# Patient Record
Sex: MALE | Race: WHITE | NOT HISPANIC OR LATINO | URBAN - METROPOLITAN AREA
[De-identification: names, ages, dates, MRNs, and addresses within clinical notes are randomized per-mention and may not be internally consistent; named-entity substitution may affect disease eponyms.]

---

## 2022-09-22 ENCOUNTER — INPATIENT (INPATIENT)
Facility: HOSPITAL | Age: 77
LOS: 0 days | Discharge: HOME | DRG: 812 | End: 2022-09-23
Attending: STUDENT IN AN ORGANIZED HEALTH CARE EDUCATION/TRAINING PROGRAM | Admitting: GENERAL ACUTE CARE HOSPITAL
Payer: COMMERCIAL

## 2022-09-22 VITALS
HEART RATE: 84 BPM | TEMPERATURE: 98 F | OXYGEN SATURATION: 100 % | SYSTOLIC BLOOD PRESSURE: 142 MMHG | DIASTOLIC BLOOD PRESSURE: 70 MMHG | RESPIRATION RATE: 16 BRPM | WEIGHT: 190.04 LBS | HEIGHT: 72 IN

## 2022-09-22 DIAGNOSIS — D50.9 IRON DEFICIENCY ANEMIA, UNSPECIFIED: ICD-10-CM

## 2022-09-22 DIAGNOSIS — S62.609A FRACTURE OF UNSPECIFIED PHALANX OF UNSPECIFIED FINGER, INITIAL ENCOUNTER FOR CLOSED FRACTURE: ICD-10-CM

## 2022-09-22 DIAGNOSIS — Z29.9 ENCOUNTER FOR PROPHYLACTIC MEASURES, UNSPECIFIED: ICD-10-CM
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
## 2022-09-22 DIAGNOSIS — K40.90 UNILATERAL INGUINAL HERNIA, WITHOUT OBSTRUCTION OR GANGRENE, NOT SPECIFIED AS RECURRENT: ICD-10-CM

## 2022-09-22 DIAGNOSIS — Z98.890 OTHER SPECIFIED POSTPROCEDURAL STATES: Chronic | ICD-10-CM

## 2022-09-22 DIAGNOSIS — N43.3 HYDROCELE, UNSPECIFIED: ICD-10-CM

## 2022-09-22 LAB
ALBUMIN SERPL ELPH-MCNC: 3 G/DL — LOW (ref 3.4–5)
ALBUMIN SERPL ELPH-MCNC: 3.6 G/DL — SIGNIFICANT CHANGE UP (ref 3.3–5)
ALP SERPL-CCNC: 133 U/L — HIGH (ref 40–120)
ALP SERPL-CCNC: 139 U/L — HIGH (ref 40–120)
ALT FLD-CCNC: 13 U/L — SIGNIFICANT CHANGE UP (ref 12–42)
ALT FLD-CCNC: 7 U/L — LOW (ref 10–45)
AMPHET UR-MCNC: NEGATIVE — SIGNIFICANT CHANGE UP
ANION GAP SERPL CALC-SCNC: 6 MMOL/L — LOW (ref 9–16)
ANION GAP SERPL CALC-SCNC: 8 MMOL/L — SIGNIFICANT CHANGE UP (ref 5–17)
ANISOCYTOSIS BLD QL: SLIGHT — SIGNIFICANT CHANGE UP
APPEARANCE UR: CLEAR — SIGNIFICANT CHANGE UP
APTT BLD: 32 SEC — SIGNIFICANT CHANGE UP (ref 27.5–35.5)
AST SERPL-CCNC: 14 U/L — SIGNIFICANT CHANGE UP (ref 10–40)
AST SERPL-CCNC: 24 U/L — SIGNIFICANT CHANGE UP (ref 15–37)
BARBITURATES UR SCN-MCNC: NEGATIVE — SIGNIFICANT CHANGE UP
BASOPHILS # BLD AUTO: 0.06 K/UL — SIGNIFICANT CHANGE UP (ref 0–0.2)
BASOPHILS # BLD AUTO: 0.1 K/UL — SIGNIFICANT CHANGE UP (ref 0–0.2)
BASOPHILS NFR BLD AUTO: 1.3 % — SIGNIFICANT CHANGE UP (ref 0–2)
BASOPHILS NFR BLD AUTO: 2.6 % — HIGH (ref 0–2)
BENZODIAZ UR-MCNC: NEGATIVE — SIGNIFICANT CHANGE UP
BILIRUB SERPL-MCNC: 0.3 MG/DL — SIGNIFICANT CHANGE UP (ref 0.2–1.2)
BILIRUB SERPL-MCNC: 0.5 MG/DL — SIGNIFICANT CHANGE UP (ref 0.2–1.2)
BILIRUB UR-MCNC: NEGATIVE — SIGNIFICANT CHANGE UP
BLD GP AB SCN SERPL QL: NEGATIVE — SIGNIFICANT CHANGE UP
BUN SERPL-MCNC: 14 MG/DL — SIGNIFICANT CHANGE UP (ref 7–23)
BUN SERPL-MCNC: 17 MG/DL — SIGNIFICANT CHANGE UP (ref 7–23)
CALCIUM SERPL-MCNC: 8.7 MG/DL — SIGNIFICANT CHANGE UP (ref 8.5–10.5)
CALCIUM SERPL-MCNC: 8.8 MG/DL — SIGNIFICANT CHANGE UP (ref 8.4–10.5)
CHLORIDE SERPL-SCNC: 105 MMOL/L — SIGNIFICANT CHANGE UP (ref 96–108)
CHLORIDE SERPL-SCNC: 105 MMOL/L — SIGNIFICANT CHANGE UP (ref 96–108)
CO2 SERPL-SCNC: 25 MMOL/L — SIGNIFICANT CHANGE UP (ref 22–31)
CO2 SERPL-SCNC: 26 MMOL/L — SIGNIFICANT CHANGE UP (ref 22–31)
COCAINE METAB.OTHER UR-MCNC: NEGATIVE — SIGNIFICANT CHANGE UP
COLOR SPEC: YELLOW — SIGNIFICANT CHANGE UP
CREAT SERPL-MCNC: 0.87 MG/DL — SIGNIFICANT CHANGE UP (ref 0.5–1.3)
CREAT SERPL-MCNC: 1.03 MG/DL — SIGNIFICANT CHANGE UP (ref 0.5–1.3)
DACRYOCYTES BLD QL SMEAR: SLIGHT — SIGNIFICANT CHANGE UP
DACRYOCYTES BLD QL SMEAR: SLIGHT — SIGNIFICANT CHANGE UP
DIFF PNL FLD: NEGATIVE — SIGNIFICANT CHANGE UP
EGFR: 75 ML/MIN/1.73M2 — SIGNIFICANT CHANGE UP
EGFR: 89 ML/MIN/1.73M2 — SIGNIFICANT CHANGE UP
EOSINOPHIL # BLD AUTO: 0.17 K/UL — SIGNIFICANT CHANGE UP (ref 0–0.5)
EOSINOPHIL # BLD AUTO: 0.2 K/UL — SIGNIFICANT CHANGE UP (ref 0–0.5)
EOSINOPHIL NFR BLD AUTO: 4.4 % — SIGNIFICANT CHANGE UP (ref 0–6)
EOSINOPHIL NFR BLD AUTO: 4.5 % — SIGNIFICANT CHANGE UP (ref 0–6)
ETHANOL SERPL-MCNC: <3 MG/DL — SIGNIFICANT CHANGE UP
FERRITIN SERPL-MCNC: 27 NG/ML — LOW (ref 30–400)
GIANT PLATELETS BLD QL SMEAR: PRESENT — SIGNIFICANT CHANGE UP
GLUCOSE SERPL-MCNC: 83 MG/DL — SIGNIFICANT CHANGE UP (ref 70–99)
GLUCOSE SERPL-MCNC: 96 MG/DL — SIGNIFICANT CHANGE UP (ref 70–99)
GLUCOSE UR QL: NEGATIVE — SIGNIFICANT CHANGE UP
HCT VFR BLD CALC: 19.7 % — CRITICAL LOW (ref 39–50)
HCT VFR BLD CALC: 19.7 % — CRITICAL LOW (ref 39–50)
HCT VFR BLD CALC: 21.4 % — LOW (ref 39–50)
HCT VFR BLD CALC: 26.4 % — LOW (ref 39–50)
HGB BLD-MCNC: 5.7 G/DL — CRITICAL LOW (ref 13–17)
HGB BLD-MCNC: 5.8 G/DL — CRITICAL LOW (ref 13–17)
HGB BLD-MCNC: 6.3 G/DL — CRITICAL LOW (ref 13–17)
HGB BLD-MCNC: 7.8 G/DL — LOW (ref 13–17)
HYPOCHROMIA BLD QL: SIGNIFICANT CHANGE UP
HYPOCHROMIA BLD QL: SLIGHT — SIGNIFICANT CHANGE UP
IMM GRANULOCYTES NFR BLD AUTO: 0.4 % — SIGNIFICANT CHANGE UP (ref 0–0.9)
INR BLD: 1.07 — SIGNIFICANT CHANGE UP (ref 0.88–1.16)
IRON SATN MFR SERPL: 11 % — LOW (ref 16–55)
IRON SATN MFR SERPL: 41 UG/DL — LOW (ref 45–165)
KETONES UR-MCNC: NEGATIVE — SIGNIFICANT CHANGE UP
LACTATE SERPL-SCNC: 0.9 MMOL/L — SIGNIFICANT CHANGE UP (ref 0.4–2)
LEUKOCYTE ESTERASE UR-ACNC: NEGATIVE — SIGNIFICANT CHANGE UP
LIDOCAIN IGE QN: 53 U/L — LOW (ref 73–393)
LYMPHOCYTES # BLD AUTO: 0.21 K/UL — LOW (ref 1–3.3)
LYMPHOCYTES # BLD AUTO: 0.67 K/UL — LOW (ref 1–3.3)
LYMPHOCYTES # BLD AUTO: 14.9 % — SIGNIFICANT CHANGE UP (ref 13–44)
LYMPHOCYTES # BLD AUTO: 5.3 % — LOW (ref 13–44)
MAGNESIUM SERPL-MCNC: 2.1 MG/DL — SIGNIFICANT CHANGE UP (ref 1.6–2.6)
MANUAL SMEAR VERIFICATION: SIGNIFICANT CHANGE UP
MANUAL SMEAR VERIFICATION: SIGNIFICANT CHANGE UP
MCHC RBC-ENTMCNC: 17.8 PG — LOW (ref 27–34)
MCHC RBC-ENTMCNC: 18 PG — LOW (ref 27–34)
MCHC RBC-ENTMCNC: 18.6 PG — LOW (ref 27–34)
MCHC RBC-ENTMCNC: 19.4 PG — LOW (ref 27–34)
MCHC RBC-ENTMCNC: 28.9 GM/DL — LOW (ref 32–36)
MCHC RBC-ENTMCNC: 29.4 GM/DL — LOW (ref 32–36)
MCHC RBC-ENTMCNC: 29.4 GM/DL — LOW (ref 32–36)
MCHC RBC-ENTMCNC: 29.5 GM/DL — LOW (ref 32–36)
MCV RBC AUTO: 61.2 FL — LOW (ref 80–100)
MCV RBC AUTO: 61.6 FL — LOW (ref 80–100)
MCV RBC AUTO: 63.1 FL — LOW (ref 80–100)
MCV RBC AUTO: 65.7 FL — LOW (ref 80–100)
METHADONE UR-MCNC: NEGATIVE — SIGNIFICANT CHANGE UP
MICROCYTES BLD QL: SIGNIFICANT CHANGE UP
MICROCYTES BLD QL: SLIGHT — SIGNIFICANT CHANGE UP
MONOCYTES # BLD AUTO: 0.11 K/UL — SIGNIFICANT CHANGE UP (ref 0–0.9)
MONOCYTES # BLD AUTO: 0.58 K/UL — SIGNIFICANT CHANGE UP (ref 0–0.9)
MONOCYTES NFR BLD AUTO: 12.9 % — SIGNIFICANT CHANGE UP (ref 2–14)
MONOCYTES NFR BLD AUTO: 2.7 % — SIGNIFICANT CHANGE UP (ref 2–14)
NEUTROPHILS # BLD AUTO: 2.96 K/UL — SIGNIFICANT CHANGE UP (ref 1.8–7.4)
NEUTROPHILS # BLD AUTO: 3.37 K/UL — SIGNIFICANT CHANGE UP (ref 1.8–7.4)
NEUTROPHILS NFR BLD AUTO: 66 % — SIGNIFICANT CHANGE UP (ref 43–77)
NEUTROPHILS NFR BLD AUTO: 85 % — HIGH (ref 43–77)
NITRITE UR-MCNC: NEGATIVE — SIGNIFICANT CHANGE UP
NRBC # BLD: 0 /100 WBCS — SIGNIFICANT CHANGE UP (ref 0–0)
OB PNL STL: NEGATIVE — SIGNIFICANT CHANGE UP
OPIATES UR-MCNC: NEGATIVE — SIGNIFICANT CHANGE UP
OVALOCYTES BLD QL SMEAR: SLIGHT — SIGNIFICANT CHANGE UP
OVALOCYTES BLD QL SMEAR: SLIGHT — SIGNIFICANT CHANGE UP
PCP SPEC-MCNC: SIGNIFICANT CHANGE UP
PCP UR-MCNC: NEGATIVE — SIGNIFICANT CHANGE UP
PH UR: 6 — SIGNIFICANT CHANGE UP (ref 5–8)
PHOSPHATE SERPL-MCNC: 2.6 MG/DL — SIGNIFICANT CHANGE UP (ref 2.5–4.5)
PLAT MORPH BLD: ABNORMAL
PLAT MORPH BLD: NORMAL — SIGNIFICANT CHANGE UP
PLATELET # BLD AUTO: 345 K/UL — SIGNIFICANT CHANGE UP (ref 150–400)
PLATELET # BLD AUTO: 348 K/UL — SIGNIFICANT CHANGE UP (ref 150–400)
PLATELET # BLD AUTO: 367 K/UL — SIGNIFICANT CHANGE UP (ref 150–400)
PLATELET # BLD AUTO: 367 K/UL — SIGNIFICANT CHANGE UP (ref 150–400)
POIKILOCYTOSIS BLD QL AUTO: SLIGHT — SIGNIFICANT CHANGE UP
POLYCHROMASIA BLD QL SMEAR: SLIGHT — SIGNIFICANT CHANGE UP
POTASSIUM SERPL-MCNC: 4 MMOL/L — SIGNIFICANT CHANGE UP (ref 3.5–5.3)
POTASSIUM SERPL-MCNC: 4.1 MMOL/L — SIGNIFICANT CHANGE UP (ref 3.5–5.3)
POTASSIUM SERPL-SCNC: 4 MMOL/L — SIGNIFICANT CHANGE UP (ref 3.5–5.3)
POTASSIUM SERPL-SCNC: 4.1 MMOL/L — SIGNIFICANT CHANGE UP (ref 3.5–5.3)
PROT SERPL-MCNC: 7.1 G/DL — SIGNIFICANT CHANGE UP (ref 6–8.3)
PROT SERPL-MCNC: 7.7 G/DL — SIGNIFICANT CHANGE UP (ref 6.4–8.2)
PROT UR-MCNC: NEGATIVE MG/DL — SIGNIFICANT CHANGE UP
PROTHROM AB SERPL-ACNC: 12.5 SEC — SIGNIFICANT CHANGE UP (ref 10.5–13.4)
RBC # BLD: 3.2 M/UL — LOW (ref 4.2–5.8)
RBC # BLD: 3.22 M/UL — LOW (ref 4.2–5.8)
RBC # BLD: 3.39 M/UL — LOW (ref 4.2–5.8)
RBC # BLD: 4.02 M/UL — LOW (ref 4.2–5.8)
RBC # FLD: 18.3 % — HIGH (ref 10.3–14.5)
RBC # FLD: 18.4 % — HIGH (ref 10.3–14.5)
RBC # FLD: 20.2 % — HIGH (ref 10.3–14.5)
RBC # FLD: 22.1 % — HIGH (ref 10.3–14.5)
RBC BLD AUTO: ABNORMAL
RBC BLD AUTO: ABNORMAL
RH IG SCN BLD-IMP: POSITIVE — SIGNIFICANT CHANGE UP
SARS-COV-2 RNA SPEC QL NAA+PROBE: SIGNIFICANT CHANGE UP
SODIUM SERPL-SCNC: 137 MMOL/L — SIGNIFICANT CHANGE UP (ref 132–145)
SODIUM SERPL-SCNC: 138 MMOL/L — SIGNIFICANT CHANGE UP (ref 135–145)
SP GR SPEC: 1.01 — SIGNIFICANT CHANGE UP (ref 1–1.03)
TARGETS BLD QL SMEAR: SLIGHT — SIGNIFICANT CHANGE UP
THC UR QL: NEGATIVE — SIGNIFICANT CHANGE UP
TIBC SERPL-MCNC: 377 UG/DL — SIGNIFICANT CHANGE UP (ref 220–430)
TRANSFERRIN SERPL-MCNC: 320 MG/DL — SIGNIFICANT CHANGE UP (ref 200–360)
UIBC SERPL-MCNC: 336 UG/DL — SIGNIFICANT CHANGE UP (ref 110–370)
UROBILINOGEN FLD QL: 0.2 E.U./DL — SIGNIFICANT CHANGE UP
WBC # BLD: 3.75 K/UL — LOW (ref 3.8–10.5)
WBC # BLD: 3.97 K/UL — SIGNIFICANT CHANGE UP (ref 3.8–10.5)
WBC # BLD: 4.49 K/UL — SIGNIFICANT CHANGE UP (ref 3.8–10.5)
WBC # BLD: 5.36 K/UL — SIGNIFICANT CHANGE UP (ref 3.8–10.5)
WBC # FLD AUTO: 3.75 K/UL — LOW (ref 3.8–10.5)
WBC # FLD AUTO: 3.97 K/UL — SIGNIFICANT CHANGE UP (ref 3.8–10.5)
WBC # FLD AUTO: 4.49 K/UL — SIGNIFICANT CHANGE UP (ref 3.8–10.5)
WBC # FLD AUTO: 5.36 K/UL — SIGNIFICANT CHANGE UP (ref 3.8–10.5)

## 2022-09-22 PROCEDURE — 99285 EMERGENCY DEPT VISIT HI MDM: CPT | Mod: 25

## 2022-09-22 PROCEDURE — 74177 CT ABD & PELVIS W/CONTRAST: CPT | Mod: 26,MH

## 2022-09-22 PROCEDURE — 99221 1ST HOSP IP/OBS SF/LOW 40: CPT

## 2022-09-22 PROCEDURE — 70450 CT HEAD/BRAIN W/O DYE: CPT | Mod: 26,MH

## 2022-09-22 PROCEDURE — 73130 X-RAY EXAM OF HAND: CPT | Mod: 26,LT

## 2022-09-22 PROCEDURE — 72125 CT NECK SPINE W/O DYE: CPT | Mod: 26,MH

## 2022-09-22 PROCEDURE — 70486 CT MAXILLOFACIAL W/O DYE: CPT | Mod: 26,MH

## 2022-09-22 PROCEDURE — 73110 X-RAY EXAM OF WRIST: CPT | Mod: 26,LT

## 2022-09-22 RX ORDER — ACETAMINOPHEN 500 MG
650 TABLET ORAL EVERY 6 HOURS
Refills: 0 | Status: DISCONTINUED | OUTPATIENT
Start: 2022-09-22 | End: 2022-09-23

## 2022-09-22 RX ORDER — ACETAMINOPHEN 500 MG
650 TABLET ORAL ONCE
Refills: 0 | Status: COMPLETED | OUTPATIENT
Start: 2022-09-22 | End: 2022-09-22

## 2022-09-22 RX ORDER — INFLUENZA VIRUS VACCINE 15; 15; 15; 15 UG/.5ML; UG/.5ML; UG/.5ML; UG/.5ML
0.7 SUSPENSION INTRAMUSCULAR ONCE
Refills: 0 | Status: DISCONTINUED | OUTPATIENT
Start: 2022-09-22 | End: 2022-09-23

## 2022-09-22 RX ORDER — PANTOPRAZOLE SODIUM 20 MG/1
40 TABLET, DELAYED RELEASE ORAL
Refills: 0 | Status: DISCONTINUED | OUTPATIENT
Start: 2022-09-22 | End: 2022-09-23

## 2022-09-22 RX ORDER — TETANUS TOXOID, REDUCED DIPHTHERIA TOXOID AND ACELLULAR PERTUSSIS VACCINE, ADSORBED 5; 2.5; 8; 8; 2.5 [IU]/.5ML; [IU]/.5ML; UG/.5ML; UG/.5ML; UG/.5ML
0.5 SUSPENSION INTRAMUSCULAR ONCE
Refills: 0 | Status: COMPLETED | OUTPATIENT
Start: 2022-09-22 | End: 2022-09-22

## 2022-09-22 RX ADMIN — Medication 650 MILLIGRAM(S): at 01:49

## 2022-09-22 RX ADMIN — TETANUS TOXOID, REDUCED DIPHTHERIA TOXOID AND ACELLULAR PERTUSSIS VACCINE, ADSORBED 0.5 MILLILITER(S): 5; 2.5; 8; 8; 2.5 SUSPENSION INTRAMUSCULAR at 04:22

## 2022-09-22 RX ADMIN — Medication 650 MILLIGRAM(S): at 03:39

## 2022-09-22 NOTE — H&P ADULT - ASSESSMENT
77 y.o M with a PMHx of hernia repair s/p subtotal gastrectomy and right hemicolectomy in 2019 presents from [facility] w/ mechanical trip and fall. Pt currently w/ acute LUE 5th metacarpal fx, nasal bridge abrasion, microcytic anemia to 5.7, and massive scrotal hydrocele 3 years in duration.

## 2022-09-22 NOTE — ED PROVIDER NOTE - CARE PLAN
1 Principal Discharge DX:	Anemia  Secondary Diagnosis:	Inguinal hernia  Secondary Diagnosis:	Fracture, metacarpal  Secondary Diagnosis:	Forearm abrasion  Secondary Diagnosis:	Facial abrasion

## 2022-09-22 NOTE — H&P ADULT - HISTORY OF PRESENT ILLNESS
77 y.o M with a PMHx of hernia repair s/p subtotal gastrectomy and right hemicolectomy in 2019 presents from [facility] w/ mechanical trip and fall earlier today 9/22/2022. Pt says he was walking outside downtown carrying groceries when he "tripped on level ground" and impacted his L wrist and nasal bridge. Pt denies sx preceding fall such as headache, LOC, dizziness, palpitations, SOB, and says he used his L hand to absorb most of the impact. Pt c.o mild pain in his L wrist but otherwise denies CP, abdominal pain, n/v/d, f/c, dysuria, hematuria, or melena.     ED Course:   ED Vitals: Temp: 98.4, HR: 75, BP: 163/85, o2%: 100% RA.   Pt arrived to [facility] in AM of 9/22/2022 after falling while carrying groceries. CT Head and cervical spine showed generalized cerebral volume loss. No hydrocephalus, midline shit, no acute intracranial hemorrhage or demarcated territorial infarct. Additionally, no acute cervical fracture, facial fracture, or traumatic malalignment found, however multilevel degenerative cervical spondylosis appreciated. X-RAY of the L wrist showed a 5th metacarpal fx that was splinted, CBC showed a hgb/hct of 5.7/19.7 respectively, no leukocytosis or thrombocytopenia. CT Abdomen showed post subtotal gastrectomy & right hemicolectomy with a moderate right hydronephrosis and right hydroureter with massive right indirect inguinoscrotal hernia containing nonobstructed bowel, fat and a large 24.6 x 24.9 x 26.8 cm encysted fluid collection. Pt admitted to [facility] for further evaluation of massive scrotal hernia and anemia requiring blood transfusion. 77 y.o M with a PMHx of hernia repair s/p subtotal gastrectomy and right hemicolectomy in 2019 presents from [facility] w/ mechanical trip and fall earlier today 9/22/2022. Pt says he was walking outside downtown carrying groceries when he "tripped on level ground" and impacted his L wrist and nasal bridge. Pt denies sx preceding fall such as headache, LOC, dizziness, palpitations, SOB, and says he used his L hand to absorb most of the impact. Pt c.o mild pain in his L wrist but otherwise denies CP, abdominal pain, n/v/d, f/c, dysuria, hematuria, or melena.   Of note: In 2019, pt recalls being so anemic he syncopized/went into shock and was in a coma for several days during which time the surgery team @ [facility] resected him for viscous perforation from gastric ulcers 2/2 h pylori infection. He was told to see outpatient provider for elective scrotal hernia repair shortly thereafter but did not follow up, during which time his hydrocele expanded.     ED Course:   ED Vitals: Temp: 98.4, HR: 75, BP: 163/85, o2%: 100% RA.   Pt arrived to [facility] in AM of 9/22/2022 after falling while carrying groceries. CT Head and cervical spine showed generalized cerebral volume loss. No hydrocephalus, midline shit, no acute intracranial hemorrhage or demarcated territorial infarct. Additionally, no acute cervical fracture, facial fracture, or traumatic malalignment found, however multilevel degenerative cervical spondylosis appreciated. X-RAY of the L wrist showed a 5th metacarpal fx that was splinted, CBC showed a hgb/hct of 5.7/19.7 respectively, no leukocytosis or thrombocytopenia. CT Abdomen showed post subtotal gastrectomy & right hemicolectomy with a moderate right hydronephrosis and right hydroureter with massive right indirect inguinoscrotal hernia containing nonobstructed bowel, fat and a large 24.6 x 24.9 x 26.8 cm encysted fluid collection. Pt admitted to [facility] for further evaluation of massive scrotal hernia and anemia requiring blood transfusion.

## 2022-09-22 NOTE — H&P ADULT - PROBLEM SELECTOR PLAN 3
CTH revealed no ICH, pt denies dizziness, headache, LOC, no focal neurological deficits appreciated. Pt denies pain, no other lesions, abrasions, bruises noted. Continue to implement wound care to nasal bridge, clean w/ NS and apply non-adherent dressing for 1-2 days.   - Will continue to observe   - RICE protocol for L hand as below

## 2022-09-22 NOTE — H&P ADULT - NSHPLABSRESULTS_GEN_ALL_CORE
.  LABS:                         5.8    3.97  )-----------( 367      ( 22 Sep 2022 09:49 )             19.7         138  |  105  |  x   ----------------------------<  x   4.0   |  x   |  x     Ca    8.7      22 Sep 2022 01:23    TPro  7.7  /  Alb  3.0<L>  /  TBili  0.3  /  DBili  x   /  AST  24  /  ALT  13  /  AlkPhos  133<H>      PT/INR - ( 22 Sep 2022 02:24 )   PT: 12.5 sec;   INR: 1.07          PTT - ( 22 Sep 2022 02:24 )  PTT:32.0 sec  Urinalysis Basic - ( 22 Sep 2022 01:23 )    Color: Yellow / Appearance: Clear / S.015 / pH: x  Gluc: x / Ketone: NEGATIVE  / Bili: NEGATIVE / Urobili: 0.2 E.U./dL   Blood: x / Protein: NEGATIVE mg/dL / Nitrite: NEGATIVE   Leuk Esterase: NEGATIVE / RBC: x / WBC x   Sq Epi: x / Non Sq Epi: x / Bacteria: x            Lactate, Blood: 0.9 mmoL/L ( @ 01:23)      RADIOLOGY, EKG & ADDITIONAL TESTS: Reviewed.

## 2022-09-22 NOTE — H&P ADULT - NSHPPHYSICALEXAM_GEN_ALL_CORE
General: Alert and oriented x 3. No acute distress. Slightly malnourished appearance.   Eyes: EOMI. Anicteric. Mild conjunctival pallor.   HEENT: Moist mucous membranes. No scleral icterus. No cervical lymphadenopathy.  Lungs: Clear to auscultation bilaterally. No accessory muscle use.  Cardiovascular: Regular rate and rhythm. No murmur. No JVD.  Abdomen: Soft, non-tender and non-distended. + Massive ~ 20-30 cm scrotal non-tender hydrocele appreciated.   Extremities: No edema. Non-tender. Cap refills < 3 seconds  Skin: + Purpuric rash on L forearm, non blanching. Small ~ 2cm horizontal abrasion on nasal bridge.   Neurologic: No focal neurological deficits. CN II-XII grossly intact, but not individually tested.  Psychiatric: Cooperative. Appropriate mood and affect. General: Alert and oriented x 3. No acute distress. Slightly malnourished appearance.   Eyes: EOMI. Anicteric. Mild conjunctival pallor.   HEENT: Moist mucous membranes. No scleral icterus. No cervical lymphadenopathy.  Lungs: Clear to auscultation bilaterally. No accessory muscle use.  Cardiovascular: Regular rate and rhythm. No murmur. No JVD.  Abdomen: Soft, non-tender and non-distended. + Massive ~ 20-30 cm scrotal non-tender hydrocele appreciated.   Extremities: No edema. Non-tender. Cap refills < 3 seconds, questionable koilonychia b/l, ecchymosis on L 5th digit finger and knuckle, FROM. No TTP.   Skin: + Purpuric rash on L forearm, non blanching. Small ~ 2cm horizontal abrasion on nasal bridge.   Neurologic: No focal neurological deficits. CN II-XII grossly intact, but not individually tested.  Psychiatric: Cooperative. Appropriate mood and affect.

## 2022-09-22 NOTE — ED PROVIDER NOTE - PHYSICAL EXAMINATION
Constitutional:  pale, chronically ill appearing, temporal wasting, oriented to person, place, time/situation and in no apparent distress.  HEENT: abrasion over nasal bridge, no tenderness to palpation.  Airway patent, Nasal mucosa clear. Mouth with normal mucosa.   Eyes: Clear bilaterally, pupils equal, round and reactive to light.  Cardiac: Normal rate, regular rhythm.  Respiratory: Breath sounds clear and equal bilaterally.  GI: Abdomen soft, non-tender, no guarding.   : Massive R inguinal hernia extending down to level of knee, nontender, nonreducible.  Rectal: brown stool in rectal vault, no melena, no blood  MSK: bruising and tenderness to L 5th metacarpal.  Spine appears normal, range of motion is not limited  Neuro: Alert and oriented, no focal deficits, no motor or sensory deficits.  Skin: abrasion to nasal bridge, abrasion to L forearm

## 2022-09-22 NOTE — CONSULT NOTE ADULT - ATTENDING COMMENTS
76yo M with PMH of hernia repair s/p subtotal gastrectomy and right hemicolectomy in 2019 presents presents with mechanical fall, found with anemia.     Patient with iron deficiency which may be due to gastrectomy. He denies any form of melena, hematochezia, coffee ground emesis, or hematemesis. The FOBT is negative. Recommend EGD + colonoscopy in the outpatient setting.     Agree with plan as outlined above.    NATHEN Astudillo MD  GI Attending

## 2022-09-22 NOTE — ED ADULT TRIAGE NOTE - CHIEF COMPLAINT QUOTE
Pt. walk in after trip and fall c/o L. arm, L. hand, R. knee, and abrasion to bridge of nose. Denies LOC, denies blood thinner use.

## 2022-09-22 NOTE — ED PROVIDER NOTE - NS ED ROS FT
Constitutional:  No fever, No chills, No night sweats  Eyes:  No visual changes, No discharge, No redness  ENMT:  No epistaxis, no nasal congestion, no throat pain, no difficulty swallowing  CV:  No chest pain, No palpitations, No peripheral edema  Resp:  No cough, No shortness of breath  GI:  No abdominal pain, No vomiting, No diarrhea  MSK:  No neck pain or stiffness, No joint swelling or pain, No back pain  Neuro: no loss of consciousness, no gait abnormality, no headache, no sensory deficits, and +weakness.  Skin:  +abrasions, no lesions, no rashes  Psych:  No known mental health issues

## 2022-09-22 NOTE — ED PROVIDER NOTE - CLINICAL SUMMARY MEDICAL DECISION MAKING FREE TEXT BOX
78yo M hx of inguinal hernia repair 3yrs ago, lost to follow up, presents with trip and fall with headstrike, without LOC.  Pt pale and chronically ill appearing on exam, with abrasion over nasal bridge, abrasion to L forearm, and bruising, swelling, and TTP to L 5th metacarpal.  Has difficulty walking due to massive R inguinal hernia extending down to pt's knee.  -Anemia: Hb 5.7.  Unclear etiology.  Guaiac negative.  -head trauma: abrasion to nasal bridge, no e/o traumatic injury on CT head, no fracture on CT facial bones or CT c-spine  -hernia: R inguinal hernia w massive hydrocele. clinically not incarcerated- no vomiting, having regular BMs.  lactate wnl.  However, size of hernia making it very difficult for pt to walk around, pt is fall risk.  -L hand injury: XR w 5th metacarpal fx.  splinted in ED.  Will need hand surgery follow up.    Discussed case w medicine, surgery, and urology teams.  Will admit to medicine for medical optimization with plan for surgical and urology consults as inpatient.

## 2022-09-22 NOTE — PATIENT PROFILE ADULT - FALL HARM RISK - HARM RISK INTERVENTIONS
Assistance with ambulation/Assistance OOB with selected safe patient handling equipment/Communicate Risk of Fall with Harm to all staff/Discuss with provider need for PT consult/Monitor for mental status changes/Monitor gait and stability/Provide patient with walking aids - walker, cane, crutches/Reinforce activity limits and safety measures with patient and family/Reorient to person, place and time as needed/Sit up slowly, dangle for a short time, stand at bedside before walking/Tailored Fall Risk Interventions/Use of alarms - bed, chair and/or voice tab/Visual Cue: Yellow wristband and red socks/Bed in lowest position, wheels locked, appropriate side rails in place/Call bell, personal items and telephone in reach/Instruct patient to call for assistance before getting out of bed or chair/Non-slip footwear when patient is out of bed/[place] to call system/Physically safe environment - no spills, clutter or unnecessary equipment/Purposeful Proactive Rounding/Room/bathroom lighting operational, light cord in reach

## 2022-09-22 NOTE — PATIENT PROFILE ADULT - FUNCTIONAL ASSESSMENT - BASIC MOBILITY 6.
1-calculated by average/Not able to assess (calculate score using [facility] averaging method)

## 2022-09-22 NOTE — H&P ADULT - PROBLEM SELECTOR PLAN 1
Pt w/ H&H of 5.7, most recent T&S obtained.  - F/u w/ 1U PRBC   - Obtain CBC 1 hour post unit   - Transfuse < hgb 7 Pt w/ H&H of 5.7, most recent T&S obtained. 1U PRBC ordered. Less likely to be GI bleed or internal bleed given hx of chronic anemia, normal HR, lack of melenic stools. Additionally, pt w/ normal bilirubin and BUN levels and no signs of blood collecting in testicles or retroperitoneum on CT. Suspecting anemia of chronic disease at this time.   - Protonix 40mg PO for prophylactic measure   - F/u w/ CBC 1 hour post unit   - F/u w/ iron studies   - Transfuse < hgb 7

## 2022-09-22 NOTE — H&P ADULT - NSHPSOCIALHISTORY_GEN_ALL_CORE
[facility]  Hx of smoking 1-2 cigarettes occasionally 10-15 years ago   No EtOH  No drugs Lives on [facility] by himself  Occupation: Former building super, currently retired   Hx of smoking 1-2 cigarettes occasionally 10-15 years ago   No EtOH  No drugs

## 2022-09-22 NOTE — ED PROVIDER NOTE - OBJECTIVE STATEMENT
78yo M hx of hernia surgery approx 3yrs ago (states he never followed up), presents with mechanical trip and fall just prior to arrival in ED.  Pt states he was walking while carrying grocery bags, tripped over uneven sidewalk and fell forward, striking nose on ground.  No LOC.  No nausea or vomiting since injury.  No change in vision or speech.  Also has pain and bruising to L hand and forearm.  Unknown last tetanus.  States he has trouble walking due to large inguinal hernia which has recurred after the surgery he had 3yrs ago.  Able to tolerate PO regularly.  Having regular BMs, most recently today.  Has felt increasingly generally weak over the past few weeks.  No black stools or blood in stool.  No CP or SOB.  No syncope.

## 2022-09-22 NOTE — H&P ADULT - NSHPREVIEWOFSYSTEMS_GEN_ALL_CORE
Pt denied melenic/hematochezic stools, denied hematemesis, says he has been feeling more fatigued the past few months.

## 2022-09-22 NOTE — H&P ADULT - PROBLEM SELECTOR PLAN 4
X-Ray of L hand performed at [facility] revealed small, non-displaced fx of L 5th metacarpal bone. Pt currently s/p finger and wrist splint. Plan is to continue with RICE protocol, avoid NSAIDs i/s/o gastrectomy and possible albeit low suspicion for GI bleed.

## 2022-09-22 NOTE — ED ADULT NURSE NOTE - OBJECTIVE STATEMENT
Patient presented to the ED s/p tripping over uneven sidewalk and falling. Patient states he was carrying groceries, and fell forward. Denies LOC or dizziness

## 2022-09-22 NOTE — H&P ADULT - PROBLEM SELECTOR PLAN 5
F: Tolerating oral  E: Replete PRN K>4 Mg<2   DVT: SCDs   N: Dash/TLC  GI: Protonix 40mg  Code status: Full code

## 2022-09-23 ENCOUNTER — TRANSCRIPTION ENCOUNTER (OUTPATIENT)
Age: 77
End: 2022-09-23

## 2022-09-23 VITALS
HEART RATE: 64 BPM | SYSTOLIC BLOOD PRESSURE: 123 MMHG | TEMPERATURE: 98 F | RESPIRATION RATE: 19 BRPM | OXYGEN SATURATION: 99 % | DIASTOLIC BLOOD PRESSURE: 66 MMHG

## 2022-09-23 DIAGNOSIS — D50.9 IRON DEFICIENCY ANEMIA, UNSPECIFIED: ICD-10-CM

## 2022-09-23 PROBLEM — K29.70 GASTRITIS, UNSPECIFIED, WITHOUT BLEEDING: Chronic | Status: ACTIVE | Noted: 2022-09-22

## 2022-09-23 PROBLEM — D64.9 ANEMIA, UNSPECIFIED: Chronic | Status: ACTIVE | Noted: 2022-09-22

## 2022-09-23 PROBLEM — K27.9 PEPTIC ULCER, SITE UNSPECIFIED, UNSPECIFIED AS ACUTE OR CHRONIC, WITHOUT HEMORRHAGE OR PERFORATION: Chronic | Status: ACTIVE | Noted: 2022-09-22

## 2022-09-23 PROBLEM — Z98.890 OTHER SPECIFIED POSTPROCEDURAL STATES: Chronic | Status: ACTIVE | Noted: 2022-09-22

## 2022-09-23 LAB
ANION GAP SERPL CALC-SCNC: 8 MMOL/L — SIGNIFICANT CHANGE UP (ref 5–17)
ANISOCYTOSIS BLD QL: SIGNIFICANT CHANGE UP
BASOPHILS # BLD AUTO: 0 K/UL — SIGNIFICANT CHANGE UP (ref 0–0.2)
BASOPHILS NFR BLD AUTO: 0 % — SIGNIFICANT CHANGE UP (ref 0–2)
BUN SERPL-MCNC: 14 MG/DL — SIGNIFICANT CHANGE UP (ref 7–23)
CALCIUM SERPL-MCNC: 8.6 MG/DL — SIGNIFICANT CHANGE UP (ref 8.4–10.5)
CHLORIDE SERPL-SCNC: 103 MMOL/L — SIGNIFICANT CHANGE UP (ref 96–108)
CO2 SERPL-SCNC: 25 MMOL/L — SIGNIFICANT CHANGE UP (ref 22–31)
CREAT SERPL-MCNC: 0.97 MG/DL — SIGNIFICANT CHANGE UP (ref 0.5–1.3)
DACRYOCYTES BLD QL SMEAR: SLIGHT — SIGNIFICANT CHANGE UP
EGFR: 80 ML/MIN/1.73M2 — SIGNIFICANT CHANGE UP
EOSINOPHIL # BLD AUTO: 0.15 K/UL — SIGNIFICANT CHANGE UP (ref 0–0.5)
EOSINOPHIL NFR BLD AUTO: 4.3 % — SIGNIFICANT CHANGE UP (ref 0–6)
GIANT PLATELETS BLD QL SMEAR: PRESENT — SIGNIFICANT CHANGE UP
GLUCOSE SERPL-MCNC: 90 MG/DL — SIGNIFICANT CHANGE UP (ref 70–99)
HCT VFR BLD CALC: 23.4 % — LOW (ref 39–50)
HCT VFR BLD CALC: 24.1 % — LOW (ref 39–50)
HCV AB S/CO SERPL IA: 0.04 S/CO — SIGNIFICANT CHANGE UP
HCV AB SERPL-IMP: SIGNIFICANT CHANGE UP
HGB BLD-MCNC: 7.1 G/DL — LOW (ref 13–17)
HGB BLD-MCNC: 7.2 G/DL — LOW (ref 13–17)
HYPOCHROMIA BLD QL: SIGNIFICANT CHANGE UP
LYMPHOCYTES # BLD AUTO: 0.38 K/UL — LOW (ref 1–3.3)
LYMPHOCYTES # BLD AUTO: 11.2 % — LOW (ref 13–44)
MACROCYTES BLD QL: SLIGHT — SIGNIFICANT CHANGE UP
MAGNESIUM SERPL-MCNC: 2 MG/DL — SIGNIFICANT CHANGE UP (ref 1.6–2.6)
MANUAL SMEAR VERIFICATION: SIGNIFICANT CHANGE UP
MCHC RBC-ENTMCNC: 19.1 PG — LOW (ref 27–34)
MCHC RBC-ENTMCNC: 19.7 PG — LOW (ref 27–34)
MCHC RBC-ENTMCNC: 29.5 GM/DL — LOW (ref 32–36)
MCHC RBC-ENTMCNC: 30.8 GM/DL — LOW (ref 32–36)
MCV RBC AUTO: 64.1 FL — LOW (ref 80–100)
MCV RBC AUTO: 64.8 FL — LOW (ref 80–100)
MICROCYTES BLD QL: SIGNIFICANT CHANGE UP
MONOCYTES # BLD AUTO: 0.2 K/UL — SIGNIFICANT CHANGE UP (ref 0–0.9)
MONOCYTES NFR BLD AUTO: 6 % — SIGNIFICANT CHANGE UP (ref 2–14)
NEUTROPHILS # BLD AUTO: 2.66 K/UL — SIGNIFICANT CHANGE UP (ref 1.8–7.4)
NEUTROPHILS NFR BLD AUTO: 78.5 % — HIGH (ref 43–77)
NRBC # BLD: 0 /100 WBCS — SIGNIFICANT CHANGE UP (ref 0–0)
OVALOCYTES BLD QL SMEAR: SLIGHT — SIGNIFICANT CHANGE UP
PHOSPHATE SERPL-MCNC: 2.8 MG/DL — SIGNIFICANT CHANGE UP (ref 2.5–4.5)
PLAT MORPH BLD: ABNORMAL
PLATELET # BLD AUTO: 322 K/UL — SIGNIFICANT CHANGE UP (ref 150–400)
PLATELET # BLD AUTO: 332 K/UL — SIGNIFICANT CHANGE UP (ref 150–400)
POIKILOCYTOSIS BLD QL AUTO: SIGNIFICANT CHANGE UP
POLYCHROMASIA BLD QL SMEAR: SLIGHT — SIGNIFICANT CHANGE UP
POTASSIUM SERPL-MCNC: 4 MMOL/L — SIGNIFICANT CHANGE UP (ref 3.5–5.3)
POTASSIUM SERPL-SCNC: 4 MMOL/L — SIGNIFICANT CHANGE UP (ref 3.5–5.3)
RBC # BLD: 3.65 M/UL — LOW (ref 4.2–5.8)
RBC # BLD: 3.72 M/UL — LOW (ref 4.2–5.8)
RBC # FLD: 21.5 % — HIGH (ref 10.3–14.5)
RBC # FLD: 21.7 % — HIGH (ref 10.3–14.5)
RBC BLD AUTO: ABNORMAL
SCHISTOCYTES BLD QL AUTO: SLIGHT — SIGNIFICANT CHANGE UP
SODIUM SERPL-SCNC: 136 MMOL/L — SIGNIFICANT CHANGE UP (ref 135–145)
SPHEROCYTES BLD QL SMEAR: SLIGHT — SIGNIFICANT CHANGE UP
TARGETS BLD QL SMEAR: SLIGHT — SIGNIFICANT CHANGE UP
WBC # BLD: 3.39 K/UL — LOW (ref 3.8–10.5)
WBC # BLD: 3.42 K/UL — LOW (ref 3.8–10.5)
WBC # FLD AUTO: 3.39 K/UL — LOW (ref 3.8–10.5)
WBC # FLD AUTO: 3.42 K/UL — LOW (ref 3.8–10.5)

## 2022-09-23 PROCEDURE — 70486 CT MAXILLOFACIAL W/O DYE: CPT

## 2022-09-23 PROCEDURE — 86901 BLOOD TYPING SEROLOGIC RH(D): CPT

## 2022-09-23 PROCEDURE — 84466 ASSAY OF TRANSFERRIN: CPT

## 2022-09-23 PROCEDURE — 86900 BLOOD TYPING SEROLOGIC ABO: CPT

## 2022-09-23 PROCEDURE — 72125 CT NECK SPINE W/O DYE: CPT

## 2022-09-23 PROCEDURE — 81003 URINALYSIS AUTO W/O SCOPE: CPT

## 2022-09-23 PROCEDURE — 85025 COMPLETE CBC W/AUTO DIFF WBC: CPT

## 2022-09-23 PROCEDURE — 99238 HOSP IP/OBS DSCHRG MGMT 30/<: CPT

## 2022-09-23 PROCEDURE — 87635 SARS-COV-2 COVID-19 AMP PRB: CPT

## 2022-09-23 PROCEDURE — 99285 EMERGENCY DEPT VISIT HI MDM: CPT

## 2022-09-23 PROCEDURE — 85610 PROTHROMBIN TIME: CPT

## 2022-09-23 PROCEDURE — 85027 COMPLETE CBC AUTOMATED: CPT

## 2022-09-23 PROCEDURE — 36430 TRANSFUSION BLD/BLD COMPNT: CPT

## 2022-09-23 PROCEDURE — 80053 COMPREHEN METABOLIC PANEL: CPT

## 2022-09-23 PROCEDURE — 86923 COMPATIBILITY TEST ELECTRIC: CPT

## 2022-09-23 PROCEDURE — 70450 CT HEAD/BRAIN W/O DYE: CPT

## 2022-09-23 PROCEDURE — 83605 ASSAY OF LACTIC ACID: CPT

## 2022-09-23 PROCEDURE — 83735 ASSAY OF MAGNESIUM: CPT

## 2022-09-23 PROCEDURE — 74177 CT ABD & PELVIS W/CONTRAST: CPT

## 2022-09-23 PROCEDURE — 83690 ASSAY OF LIPASE: CPT

## 2022-09-23 PROCEDURE — 83550 IRON BINDING TEST: CPT

## 2022-09-23 PROCEDURE — 82728 ASSAY OF FERRITIN: CPT

## 2022-09-23 PROCEDURE — 73110 X-RAY EXAM OF WRIST: CPT

## 2022-09-23 PROCEDURE — 85730 THROMBOPLASTIN TIME PARTIAL: CPT

## 2022-09-23 PROCEDURE — 97161 PT EVAL LOW COMPLEX 20 MIN: CPT

## 2022-09-23 PROCEDURE — 90471 IMMUNIZATION ADMIN: CPT

## 2022-09-23 PROCEDURE — 84100 ASSAY OF PHOSPHORUS: CPT

## 2022-09-23 PROCEDURE — 80307 DRUG TEST PRSMV CHEM ANLYZR: CPT

## 2022-09-23 PROCEDURE — 90715 TDAP VACCINE 7 YRS/> IM: CPT

## 2022-09-23 PROCEDURE — 73130 X-RAY EXAM OF HAND: CPT

## 2022-09-23 PROCEDURE — 82272 OCCULT BLD FECES 1-3 TESTS: CPT

## 2022-09-23 PROCEDURE — 83540 ASSAY OF IRON: CPT

## 2022-09-23 PROCEDURE — P9016: CPT

## 2022-09-23 PROCEDURE — 86803 HEPATITIS C AB TEST: CPT

## 2022-09-23 PROCEDURE — 36415 COLL VENOUS BLD VENIPUNCTURE: CPT

## 2022-09-23 PROCEDURE — 80048 BASIC METABOLIC PNL TOTAL CA: CPT

## 2022-09-23 PROCEDURE — 86850 RBC ANTIBODY SCREEN: CPT

## 2022-09-23 RX ORDER — IRON SUCROSE 20 MG/ML
300 INJECTION, SOLUTION INTRAVENOUS ONCE
Refills: 0 | Status: COMPLETED | OUTPATIENT
Start: 2022-09-23 | End: 2022-09-23

## 2022-09-23 RX ORDER — FERROUS SULFATE 325(65) MG
1 TABLET ORAL
Qty: 30 | Refills: 3
Start: 2022-09-23

## 2022-09-23 RX ORDER — FERROUS SULFATE 325(65) MG
1 TABLET ORAL
Qty: 30 | Refills: 3
Start: 2022-09-23 | End: 2023-01-20

## 2022-09-23 RX ORDER — POLYETHYLENE GLYCOL 3350 17 G/17G
17 POWDER, FOR SOLUTION ORAL
Qty: 0 | Refills: 0 | DISCHARGE

## 2022-09-23 RX ADMIN — IRON SUCROSE 176.67 MILLIGRAM(S): 20 INJECTION, SOLUTION INTRAVENOUS at 12:46

## 2022-09-23 NOTE — PHYSICAL THERAPY INITIAL EVALUATION ADULT - PERTINENT HX OF CURRENT PROBLEM, REHAB EVAL
77 y.o M with a PMHx of hernia repair s/p subtotal gastrectomy and right hemicolectomy in 2019 presents from [facility] w/ mechanical trip and fall. Pt currently w/ acute LUE 5th metacarpal fx, nasal bridge abrasion, microcytic anemia to 5.7, and massive scrotal hydrocele 3 years in duration.

## 2022-09-23 NOTE — DISCHARGE NOTE PROVIDER - NSDCFUADDAPPT_GEN_ALL_CORE_FT
Please follow up with your surgeon Dr. Jett at  9/30/22, 11:15am at [address] [place] [address], [address], [place] [address] for further management of your hernia.    Please follow up with your surgeon Dr. Jett at  9/30/22, 11:15am at [address] [place] [address], [address], [place] [address] for further management of your hernia.     Please follow up with your gastrointestinal doctor Dr. Fowler at 10/10/2022, 3:00 pm  [address] [address] for assessment of your possible source of bleeding from the GI tract.     Please follow up with our primary care clinic, located at [address], [address] [place] [address] for further management of your overall medical conditions. They are aware of your medical history and someone will be reaching out by phone to contact you for scheduling of an appointment.

## 2022-09-23 NOTE — PHYSICAL THERAPY INITIAL EVALUATION ADULT - GENERAL OBSERVATIONS, REHAB EVAL
PT IE Completed. Pt received semi-supine in bed, A&Ox4, splint, +RA,+heplock, in NAD and agreeable to work with PT, AMALIA Lacy notified. Pt presents to [facility] s/p fall and sustained L 5th metacarpal nondisplaced fx. PT exam shows pt is safe and independent with all bed mobility, functional transfers and ambulation, and stair negotiation. Pt balance is altered by significant hernia and is advised to use standard cane for ambulating in community. Pt left as found, AMALIA Lacy notified, bed alarm, +call bell within reach. Pt is discharged from PT program at this time. Should patient status change, new PT consult is recommended.

## 2022-09-23 NOTE — OCCUPATIONAL THERAPY INITIAL EVALUATION ADULT - ADDITIONAL COMMENTS
Pt lives alone in an apartment with ~4 JONAH. Pt is L hand dominant. Prior to admit, pt states being independent with all ADLs/IADLs and mobility, reports that he owns a cane but does not use it. Pt has a friend that states will be available to assist if needed and drives him places. Pt has a tub/shower combination.

## 2022-09-23 NOTE — PHYSICAL THERAPY INITIAL EVALUATION ADULT - ADDITIONAL COMMENTS
Pt owns cane from previous hospital admission a few years ago. Pt denies recent fall history excluding reason for admission. Pt is independent with all shopping, cooking etc but has assist for travel from his cousin at times.

## 2022-09-23 NOTE — PROGRESS NOTE ADULT - PROBLEM SELECTOR PLAN 2
Pt w/ H&H of 5.7, most recent T&S obtained. 1U PRBC ordered. Less likely to be GI bleed or internal bleed given hx of chronic anemia, normal HR, lack of melenic stools. Additionally, pt w/ normal bilirubin and BUN levels and no signs of blood collecting in testicles or retroperitoneum on CT. Suspecting anemia of chronic disease at this time.   - Protonix 40mg PO for prophylactic measure   - F/u w/ CBC showed 5.8->6.3 after transfusion--> overnight cbc was 7.8 which down trended to 7.1 in the morning   - iron studies consistent with Iron deficiency anemia   - Transfuse < hgb 7  - will repeat cbc at 2 pm and plan for discharge

## 2022-09-23 NOTE — OCCUPATIONAL THERAPY INITIAL EVALUATION ADULT - DIAGNOSIS, OT EVAL
Pt admitted s/p mechanical fall resulting in LUE 5th metacarpal fx presents at functional baseline with pt able to complete all ADLs independently using one handed strategies and demonstrates good standing balance with pt able to perform all transfers and mobility independently without AD (recommended that pt use SC for out in community). Educated patient on NWBing of L hand to optimize healing, demo good carryover. No further acute OT needs. Pt will be d/c from OT at this time.

## 2022-09-23 NOTE — DISCHARGE NOTE PROVIDER - PROVIDER TOKENS
PROVIDER:[TOKEN:[[id number]],SCHEDULEDAPPT:[09/30/2022],SCHEDULEDAPPTTIME:[11:15 AM]] PROVIDER:[TOKEN:[[id number]],SCHEDULEDAPPT:[09/30/2022],SCHEDULEDAPPTTIME:[11:15 AM]],PROVIDER:[TOKEN:[[id number]],SCHEDULEDAPPT:[10/10/2022],SCHEDULEDAPPTTIME:[03:00 PM]]

## 2022-09-23 NOTE — PROGRESS NOTE ADULT - SUBJECTIVE AND OBJECTIVE BOX
SUBJECTIVE: patient seen and evaluated. No specific complaints this morning. Denies nausea, vomiting, abdominal pain. Hernia is not painful this AM      MEDICATIONS  (STANDING):  influenza  Vaccine (HIGH DOSE) 0.7 milliLiter(s) IntraMuscular once  pantoprazole    Tablet 40 milliGRAM(s) Oral before breakfast    MEDICATIONS  (PRN):  acetaminophen     Tablet .. 650 milliGRAM(s) Oral every 6 hours PRN Temp greater or equal to 38C (100.4F), Mild Pain (1 - 3)      Vital Signs Last 24 Hrs  T(C): 36.8 (23 Sep 2022 09:05), Max: 37.2 (22 Sep 2022 21:54)  T(F): 98.2 (23 Sep 2022 09:05), Max: 99 (22 Sep 2022 21:54)  HR: 69 (23 Sep 2022 09:05) (61 - 69)  BP: 128/77 (23 Sep 2022 09:05) (123/62 - 143/75)  BP(mean): --  RR: 18 (23 Sep 2022 09:05) (18 - 18)  SpO2: 99% (23 Sep 2022 09:05) (98% - 99%)    Parameters below as of 23 Sep 2022 09:05  Patient On (Oxygen Delivery Method): room air        Physical Exam:  General: NAD, resting comfortably in bed  Pulmonary: Nonlabored breathing, no respiratory distress  Cardiovascular: NSR  Abdominal: Large right inguinal hernia, firm to touch non tender, minimal skin changes      I&O's Summary      LABS:                        7.8    5.36  )-----------( 367      ( 22 Sep 2022 22:55 )             26.4         138  |  105  |  14  ----------------------------<  96  4.0   |  25  |  0.87    Ca    8.8      22 Sep 2022 09:49  Phos  2.6       Mg     2.1         TPro  7.1  /  Alb  3.6  /  TBili  0.5  /  DBili  x   /  AST  14  /  ALT  7<L>  /  AlkPhos  139<H>      PT/INR - ( 22 Sep 2022 02:24 )   PT: 12.5 sec;   INR: 1.07          PTT - ( 22 Sep 2022 02:24 )  PTT:32.0 sec  Urinalysis Basic - ( 22 Sep 2022 01:23 )    Color: Yellow / Appearance: Clear / S.015 / pH: x  Gluc: x / Ketone: NEGATIVE  / Bili: NEGATIVE / Urobili: 0.2 E.U./dL   Blood: x / Protein: NEGATIVE mg/dL / Nitrite: NEGATIVE   Leuk Esterase: NEGATIVE / RBC: x / WBC x   Sq Epi: x / Non Sq Epi: x / Bacteria: x      CAPILLARY BLOOD GLUCOSE        LIVER FUNCTIONS - ( 22 Sep 2022 09:49 )  Alb: 3.6 g/dL / Pro: 7.1 g/dL / ALK PHOS: 139 U/L / ALT: 7 U/L / AST: 14 U/L / GGT: x             RADIOLOGY & ADDITIONAL STUDIES:

## 2022-09-23 NOTE — DISCHARGE NOTE NURSING/CASE MANAGEMENT/SOCIAL WORK - NSDCPEFALRISK_GEN_ALL_CORE
For information on Fall & Injury Prevention, visit: [email or web address] OR  [email or web address] OR  https://www.cdc.gov/steadi/patient.html

## 2022-09-23 NOTE — OCCUPATIONAL THERAPY INITIAL EVALUATION ADULT - PERTINENT HX OF CURRENT PROBLEM, REHAB EVAL
77 y o M with a PMHx of hernia repair s/p subtotal gastrectomy and right hemicolectomy in 2019 presents from [facility] w/ mechanical trip and fall. Pt currently w/ acute LUE 5th metacarpal fx, nasal bridge abrasion, microcytic anemia to 5.7, and massive scrotal hydrocele 3 years in duration.

## 2022-09-23 NOTE — DISCHARGE NOTE NURSING/CASE MANAGEMENT/SOCIAL WORK - NSDCVIVACCINE_GEN_ALL_CORE_FT
Tdap; 22-Sep-2022 04:22; Lindsay Malcolm (AMALIA); [employer]; [id number] (Exp. Date: 04-Nov-2024); IntraMuscular; Deltoid Left.; 0.5 milliLiter(s); VIS (VIS Published: 09-May-2013, VIS Presented: 22-Sep-2022);

## 2022-09-23 NOTE — OCCUPATIONAL THERAPY INITIAL EVALUATION ADULT - MODIFIED CLINICAL TEST OF SENSORY INTEGRATION IN BALANCE TEST
Pt able to ambulate ~100' independently without AD, 2 slight LOB occurred with pt able to recover. Pt demo wide based gait and decreased weight-shifting ability 2/2 scrotal hydrocele at baseline. Pt also able to safely navigate up/down 4 steps using R hand rails without any LOB independently

## 2022-09-23 NOTE — DISCHARGE NOTE NURSING/CASE MANAGEMENT/SOCIAL WORK - NSDCFUADDAPPT_GEN_ALL_CORE_FT
Please follow up with your surgeon Dr. Jett at  9/30/22, 11:15am at [address] [place] [address], [address], [place] [address] for further management of your hernia.     Please follow up with your gastrointestinal doctor Dr. Fowler at 10/10/2022, 3:00 pm  [address] [address] for assessment of your possible source of bleeding from the GI tract.     Please follow up with our primary care clinic, located at [address], [address] [place] [address] for further management of your overall medical conditions. They are aware of your medical history and someone will be reaching out by phone to contact you for scheduling of an appointment.

## 2022-09-23 NOTE — PROGRESS NOTE ADULT - ASSESSMENT
77-year-old male with PMHx of PUD s/p partial gastrectomy (2019) and R inguinal hernia s/p repair (2019) admitted for mechanical fall is consulted with General Surgery for extremely large recurred inguinal hernia with possible fluid collection.     Vital signs are stable.  Physical examination is notable for soft, nontender, nondistended abdomen with extremely R inguinal hernia that is nontender, firm, and has R anterior edematous skin changes. Inferior scrotum has moderately dilated superficial veins. Serum laboratory results are notable for moderately severe anemia (Hct 5.7L, Hgb 19.7L) and mildly elevated Alk Phos (133). CT Abdomen/Pelvis with IV Contrast was notable for massive R inguinal hernia containing numerous loops of nonobstructed small bowel and a large cystic structure measuring up to 27 cm suggestive of amassive hydrocele.  Additionally moderate R hydroureteronephrosis noted 2/2 extrinsic compression as it courses into the superior aspect of the right inguinal hernia sac and mild biliary ductal dilatation of uncertain etiology.  As the hernia is a chronic condition without bowel obstruction/incarceration and the patient is unstable as it relates to his moderate anemia, it is recommended he is optimized prior to any surgical intervention. Patient may follow up for elective repair of hernia following hospitalization        PLAN   - continued care per medicine team  - Surgery will sign off, following discharge patient may follow up for elective repair  - Please follow up with Dr. Jett following discharge. Call his office to schedule an appointment: [phone]    
77 y.o M with a PMHx of hernia repair s/p subtotal gastrectomy and right hemicolectomy in 2019 presents from [facility] w/ mechanical trip and fall earlier today 9/22/2022. GI consulted for anemia.     #Anemia  - iron studies consistent with WALLACE  - interestingly, his FOBT is negative  - no melena or hematochezia reported  - h/o subtotal gastrectomy  - would monitor Hgb, and plan for outpatient bidirectional endoscopy  - has outpatient appt with GI already set up with Dr. Fowler on 10/10/22 @ 3pm    Thank you for allowing us to participate in the care of this patient.  GI will sign off. Please call back with any questions or concerns.     Jose Ortiz MD  PGY-6, Gastroenterology Fellow  pager: [phone]
77 y.o M with a PMHx of hernia repair s/p subtotal gastrectomy and right hemicolectomy in 2019 presents from [facility] w/ mechanical trip and fall. Pt currently w/ acute LUE 5th metacarpal fx, nasal bridge abrasion, microcytic anemia to 5.7, and massive scrotal hydrocele 3 years in duration.

## 2022-09-23 NOTE — CONSULT NOTE ADULT - ASSESSMENT
per Internal Medicine    77 y o M with a PMHx of hernia repair s/p subtotal gastrectomy and right hemicolectomy in 2019 presents from [facility] w/ mechanical trip and fall. Pt currently w/ acute LUE 5th metacarpal fx, nasal bridge abrasion, microcytic anemia to 5.7, and massive scrotal hydrocele 3 years in duration.       Problem/Plan - 1:  ·  Problem: Iron deficiency anemia.   ·  Plan: iron studies consistent with Iron deficiency anemia   low ferritin and iron levels  will give 1 dose of IV iron inpt 300 x1 and discharge on oral iron supplements with stool softener   GI recommends outpt GI work up.    Problem/Plan - 2:  ·  Problem: Microcytic anemia.   ·  Plan: Pt w/ H&H of 5.7, most recent T&S obtained. 1U PRBC ordered. Less likely to be GI bleed or internal bleed given hx of chronic anemia, normal HR, lack of melenic stools. Additionally, pt w/ normal bilirubin and BUN levels and no signs of blood collecting in testicles or retroperitoneum on CT. Suspecting anemia of chronic disease at this time.   - Protonix 40mg PO for prophylactic measure   - F/u w/ CBC showed 5.8->6.3 after transfusion--> overnight cbc was 7.8 which down trended to 7.1 in the morning   - iron studies consistent with Iron deficiency anemia   - Transfuse < hgb 7  - will repeat cbc at 2 pm and plan for discharge.    Problem/Plan - 3:  ·  Problem: Chronic hydrocele.   ·  Plan: Pt does not c.o pain, says his hydrocele has been increasing in size 1 month after his partial gastrectomy/hemicolectomy in 2019. Pt did not seek medical attention at that time. CT scan did not reveal incarceration of scrotal hernia, no induration or ecchymosis noted in testicular region. S/s c/w chronic hydrocele. Gen-surgery consulted, will appreciate recs.   - Elevate scrotum for now  - No-urgent indications for scrotal surgery at this time   - If changes in pain quality or color appreciated, urgent CT indicated.    Problem/Plan - 4:  ·  Problem: Fall with injury.   ·  Plan: CTH revealed no ICH, pt denies dizziness, headache, LOC, no focal neurological deficits appreciated. Pt denies pain, no other lesions, abrasions, bruises noted. Continue to implement wound care to nasal bridge, clean w/ NS and apply non-adherent dressing for 1-2 days.   - Will continue to observe   - RICE protocol for L hand as below  - pending PT recs.    Problem/Plan - 5:  ·  Problem: Nondisplaced fracture of phalanx of finger of left hand.   ·  Plan: X-Ray of L hand performed at [facility] revealed small, non-displaced fx of L 5th metacarpal bone. Pt currently s/p finger and wrist splint. Plan is to continue with RICE protocol, avoid NSAIDs i/s/o gastrectomy and possible albeit low suspicion for GI bleed.    Problem/Plan - 6:  ·  Problem: Prophylactic measure.   ·  Plan: F: Tolerating oral  E: Replete PRN K>4 Mg<2   DVT: SCDs   N: Dash/TLC  GI: Protonix 40mg  Code status: Full code.
77-year-old male with PMHx of PUD s/p partial gastrectomy (2019) and R inguinal hernia s/p repair (2019) admitted for mechanical fall is consulted with General Surgery for extremely large recurred inguinal hernia with possible fluid collection.     Vital signs are stable.  Physical examination is notable for soft, nontender, nondistended abdomen with extremely R inguinal hernia that is nontender, firm, and has R anterior edematous skin changes. Inferior scrotum has moderately dilated superficial veins. Serum laboratory results are notable for moderately severe anemia (Hct 5.7L, Hgb 19.7L) and mildly elevated Alk Phos (133). CT Abdomen/Pelvis with IV Contrast was notable for massive R inguinal hernia containing numerous loops of nonobstructed small bowel and a large cystic structure measuring up to 27 cm suggestive of amassive hydrocele.  Additionally moderate R hydroureteronephrosis noted 2/2 extrinsic compression as it courses into the superior aspect of the right inguinal hernia sac and mild biliary ductal dilatation of uncertain etiology.  As the hernia is a chronic condition without bowel obstruction/incarceration and the patient is unstable as it relates to his moderate anemia, it is recommended he is optimized prior to any surgical intervention.        PLAN   - Recommend colonoscopy  - Recommend work-up for anemia etiology  - No surgical intervention is indicated now  - Surgery Team 4C will continue to follow.  - Please page Team 4 at [phone] with any questions and/or clinical changes.  - Patient discussed with the chief and the attending    
77 y.o M with a PMHx of hernia repair s/p subtotal gastrectomy and right hemicolectomy in 2019 presents from [facility] w/ mechanical trip and fall earlier today 9/22/2022. GI consulted for anemia.     #Anemia  - iron studies consistent with WALLACE  - interestingly, his FOBT is negative  - no melena or hematochezia reported  - h/o subtotal gastrectomy  - would monitor Hgb, and most likely plan for outpatient bidirectional endoscopy  - will continue to follow     oJse Ortiz MD  PGY-6, Gastroenterology Fellow  pager: [phone]

## 2022-09-23 NOTE — PROGRESS NOTE ADULT - SUBJECTIVE AND OBJECTIVE BOX
GASTROENTEROLOGY PROGRESS NOTE  Patient seen and examined at bedside. No acute complaints. Denies abd pain, n/v.     PERTINENT REVIEW OF SYSTEMS:  CONSTITUTIONAL: No weakness, fevers or chills  HEENT: No visual changes; No vertigo or throat pain   GASTROINTESTINAL: As above.  NEUROLOGICAL: No numbness or weakness  SKIN: No itching, burning, rashes, or lesions     Allergies    No Known Allergies    Intolerances      MEDICATIONS:  MEDICATIONS  (STANDING):  influenza  Vaccine (HIGH DOSE) 0.7 milliLiter(s) IntraMuscular once  pantoprazole    Tablet 40 milliGRAM(s) Oral before breakfast    MEDICATIONS  (PRN):  acetaminophen     Tablet .. 650 milliGRAM(s) Oral every 6 hours PRN Temp greater or equal to 38C (100.4F), Mild Pain (1 - 3)    Vital Signs Last 24 Hrs  T(C): 36.8 (23 Sep 2022 09:05), Max: 37.2 (22 Sep 2022 21:54)  T(F): 98.2 (23 Sep 2022 09:05), Max: 99 (22 Sep 2022 21:54)  HR: 61 (23 Sep 2022 11:40) (61 - 69)  BP: 129/71 (23 Sep 2022 11:40) (128/77 - 143/68)  BP(mean): --  RR: 18 (23 Sep 2022 09:05) (18 - 18)  SpO2: 99% (23 Sep 2022 09:05) (98% - 99%)    Parameters below as of 23 Sep 2022 09:05  Patient On (Oxygen Delivery Method): room air         @ 07:01  -   @ 16:09  --------------------------------------------------------  IN: 0 mL / OUT: 600 mL / NET: -600 mL      PHYSICAL EXAM:    General: in no acute distress  HEENT: MMM, conjunctiva and sclera clear  Gastrointestinal: Soft non-tender non-distended; No rebound or guarding  Skin: Warm and dry. No obvious rash    LABS:                        7.2    3.42  )-----------( 322      ( 23 Sep 2022 14:51 )             23.4         136  |  103  |  14  ----------------------------<  90  4.0   |  25  |  0.97    Ca    8.6      23 Sep 2022 09:45  Phos  2.8       Mg     2.0         TPro  7.1  /  Alb  3.6  /  TBili  0.5  /  DBili  x   /  AST  14  /  ALT  7<L>  /  AlkPhos  139<H>      PT/INR - ( 22 Sep 2022 02:24 )   PT: 12.5 sec;   INR: 1.07          PTT - ( 22 Sep 2022 02:24 )  PTT:32.0 sec      Urinalysis Basic - ( 22 Sep 2022 01:23 )    Color: Yellow / Appearance: Clear / S.015 / pH: x  Gluc: x / Ketone: NEGATIVE  / Bili: NEGATIVE / Urobili: 0.2 E.U./dL   Blood: x / Protein: NEGATIVE mg/dL / Nitrite: NEGATIVE   Leuk Esterase: NEGATIVE / RBC: x / WBC x   Sq Epi: x / Non Sq Epi: x / Bacteria: x                RADIOLOGY & ADDITIONAL STUDIES:  Reviewed

## 2022-09-23 NOTE — OCCUPATIONAL THERAPY INITIAL EVALUATION ADULT - MANUAL MUSCLE TESTING RESULTS, REHAB EVAL
BUE/BLE >3/5 throughout based on functional mobility assessment. LUE grossly assessed as pt with NWBing in L hand

## 2022-09-23 NOTE — PROGRESS NOTE ADULT - ATTENDING COMMENTS
78 yo M with a PMHx of hernia repair s/p subtotal gastrectomy and right hemicolectomy in 2019 presents from [facility] w/ mechanical trip and fall. Pt currently w/ acute LUE 5th metacarpal fx, nasal bridge abrasion, microcytic anemia to 5.7, and massive scrotal hydrocele 3 years in duration.   Pt 78 yo M with a PMHx of hernia repair s/p subtotal gastrectomy and right hemicolectomy in 2019 presents from [facility] w/ mechanical trip and fall. Pt currently w/ acute LUE 5th metacarpal fx, nasal bridge abrasion, microcytic anemia to 5.7, and massive scrotal hydrocele 3 years in duration.   Pt was admitted for work up symptomatic anemia and concern for GIB. Pt received 2 u prbc. F/u w/ CBC showed 5.8->6.3 after transfusion--> overnight cbc was 7.8 which down trended to 7.1 in the morning. Iron studies consistent with iron def anemia. Pt was seen by GI and recommended to have outpt follow up. will give IV iron x 1 dose and dc on oral iron supp if repeat cbc at 2 pm is stable. if hgb < 7--> will transfuse and reach out to GI for inpt scope. will advance diet to clears. Pt will be evaluated by PT for fall work up. 78 yo M with a PMHx of hernia repair s/p subtotal gastrectomy and right hemicolectomy in 2019 presents from [facility] w/ mechanical trip and fall. Pt currently w/ acute LUE 5th metacarpal fx, nasal bridge abrasion, microcytic anemia to 5.7, and massive scrotal hydrocele 3 years in duration. CT showed CT Abdomen/Pelvis with IV Contrast was notable for massive R inguinal hernia containing numerous loops of nonobstructed small bowel and a large cystic structure measuring up to 27 cm suggestive of a massive hydrocele. Surgery saw pt and recs colonoscope, medical stabilization prior to any surgical procedure.     PE: General: no acute distress  Eyes: Anicteric sclerae, moist conjunctivae  HENT: Moist mucous membranes  Neck: Trachea midline, supple  Lungs: Normal respiratory effort, no intercostal retractions  Cardiovascular: RRR  Abdomen: Soft, non-tender non-distended; No rebound or guarding; right inguinal hernia, massive scrotal edema  Extremities: Normal range of motion, No clubbing, cyanosis or edema  Neurological: Alert and oriented x3  Skin: Warm and dry. No obvious rash    Pt was admitted for work up symptomatic anemia and concern for GIB. Pt received 2 u prbc. F/u w/ CBC showed 5.8->6.3 after transfusion--> overnight cbc was 7.8 which down trended to 7.1 in the morning. Iron studies consistent with iron def anemia. Pt was seen by GI and recommended to have outpt bidirectional endoscope. will give IV iron x 1 dose and dc on oral iron supp if repeat cbc at 2 pm is stable. if hgb < 7--> will transfuse and reach out to GI for inpt scope. will advance diet to clears. Pt will be evaluated by PT prior to discharge to ensure safe discharge home.

## 2022-09-23 NOTE — OCCUPATIONAL THERAPY INITIAL EVALUATION ADULT - GENERAL OBSERVATIONS, REHAB EVAL
Pt received semi-supine in bed, +heplock, +L wrist splint and ACE wrap, in NAD and agreeable to OT. Cleared by AMALIA Lacy to see.

## 2022-09-23 NOTE — PROGRESS NOTE ADULT - PROBLEM SELECTOR PLAN 1
iron studies consistent with Iron deficiency anemia   low ferritin and iron levels  will give 1 dose of IV iron inpt 300 x1 and discharge on oral iron supplements with stool softener   GI recommends outpt GI work up

## 2022-09-23 NOTE — DISCHARGE NOTE PROVIDER - NSDCCPCAREPLAN_GEN_ALL_CORE_FT
PRINCIPAL DISCHARGE DIAGNOSIS  Diagnosis: Anemia  Assessment and Plan of Treatment: Anemia results from a lack of red blood cells or dysfunctional red blood cells in the body. This leads to reduced oxygen flow to the body's organs. Symptoms may include fatigue, skin pallor, shortness of breath, lightheadedness, dizziness, or a fast heartbeat. Sometimes this can happen when people bleed from their intestinal tract, which has happened to you before. There was no indication that there was blood in your stool as you have seen for yourself, and we tested your stool for blood and did not find any. Regardless, because of your history of bleed from your intestines, we have made a follow up appointment with your GI (GastroIntestinal) doctors to futher work up why this may have happened. In addition, we tested your iron levels in the hospital and they were low, this can also cause anemia and low blood hemoglobin levels. We are sending you out with an iron supplement, please take one tablet per day.      SECONDARY DISCHARGE DIAGNOSES  Diagnosis: Inguinal hernia  Assessment and Plan of Treatment: An inguinal hernia occurs when tissue, such as part of the intestine, protrudes through a weak spot in the abdominal muscles. The resulting bulge can be painful, especially when you cough, bend over or lift a heavy object. However, many hernias do not cause pain. This problem can sometimes be surgically resolved, and as such we have made a follow up appointment with your general surgery doctors to see if this is an option for you. Please go to your follow up appointment on 9/30/22 at 11:15am at [address] [facility] [address] with Dr. Jett for this purpose.    Diagnosis: Fracture, metacarpal  Assessment and Plan of Treatment:     Diagnosis: Forearm abrasion  Assessment and Plan of Treatment:     Diagnosis: Facial abrasion  Assessment and Plan of Treatment:      PRINCIPAL DISCHARGE DIAGNOSIS  Diagnosis: Anemia  Assessment and Plan of Treatment: Anemia results from a lack of red blood cells or dysfunctional red blood cells in the body. This leads to reduced oxygen flow to the body's organs. Symptoms may include fatigue, skin pallor, shortness of breath, lightheadedness, dizziness, or a fast heartbeat. Sometimes this can happen when people bleed from their intestinal tract, which has happened to you before. There was no indication that there was blood in your stool as you have seen for yourself, and we tested your stool for blood and did not find any. Regardless, because of your history of bleed from your intestines, we have made a follow up appointment with your GI (GastroIntestinal) doctors to futher work up why this may have happened. In addition, we tested your iron levels in the hospital and they were low, this can also cause anemia and low blood hemoglobin levels. We are sending you out with an iron supplement, please take one tablet per day. Iron supplements can sometimes cause constipation, we have also prescribed you a laxative, Miralax, in anticipation of this. Please take 17 grams of Miralax a day if you are having constipation.      SECONDARY DISCHARGE DIAGNOSES  Diagnosis: Inguinal hernia  Assessment and Plan of Treatment: An inguinal hernia occurs when tissue, such as part of the intestine, protrudes through a weak spot in the abdominal muscles. The resulting bulge can be painful, especially when you cough, bend over or lift a heavy object. However, many hernias do not cause pain. This problem can sometimes be surgically resolved, and as such we have made a follow up appointment with your general surgery doctors to see if this is an option for you. Please go to your follow up appointment on 9/30/22 at 11:15am at [address] [facility] [address] with Dr. Jett for this purpose.

## 2022-09-23 NOTE — PROGRESS NOTE ADULT - PROBLEM SELECTOR PLAN 4
CTH revealed no ICH, pt denies dizziness, headache, LOC, no focal neurological deficits appreciated. Pt denies pain, no other lesions, abrasions, bruises noted. Continue to implement wound care to nasal bridge, clean w/ NS and apply non-adherent dressing for 1-2 days.   - Will continue to observe   - RICE protocol for L hand as below  - pending PT recs

## 2022-09-23 NOTE — PROGRESS NOTE ADULT - ATTENDING COMMENTS
76yo M with PMH of hernia repair s/p subtotal gastrectomy and right hemicolectomy in 2019 presents presents with mechanical fall, found with anemia.     Patient with iron deficiency which may be due to gastrectomy. He denies any form of melena, hematochezia, coffee ground emesis, or hematemesis. The FOBT is negative. Recommend EGD + colonoscopy in the outpatient setting. Hb stable.    Agree with plan as outlined above.    NATHEN Astudillo MD  GI Attending

## 2022-09-23 NOTE — DISCHARGE NOTE PROVIDER - CARE PROVIDER_API CALL
Pearl Jett (MD)  Surgery  [address], [address]  [place] [address]  Phone: [phone]  Fax: [phone]  Scheduled Appointment: 09/30/2022 11:15 AM   Pearl Jett)  Surgery  [address], [address]  [place] [address]  Phone: [phone]  Fax: [phone]  Scheduled Appointment: 09/30/2022 11:15 AM    French Fowler)  Medicine  [address]  [place] [id number]  Phone: [phone]  Fax: [phone]  Scheduled Appointment: 10/10/2022 03:00 PM

## 2022-09-23 NOTE — DISCHARGE NOTE PROVIDER - HOSPITAL COURSE
77 y.o M with a PMHx of hernia repair s/p subtotal gastrectomy and right hemicolectomy in 2019 presents from [facility] w/ mechanical trip and fall on 9/22/2022. Pt says he was walking outside downtown carrying groceries when he "tripped on level ground" and impacted his L wrist and nasal bridge. Pt denies sx preceding fall such as headache, LOC, dizziness, palpitations, SOB, and says he used his L hand to absorb most of the impact. Pt c.o mild pain in his L wrist but otherwise denies CP, abdominal pain, n/v/d, f/c, dysuria, hematuria, or melena. Of note: In 2019, pt recalls being so anemic he syncopized/went into shock and was in a coma for several days during which time the surgery team @ [facility] resected him for viscous perforation from gastric ulcers 2/2 h pylori infection. He was told to see outpatient provider for elective scrotal hernia repair shortly thereafter but did not follow up, during which time his hydrocele expanded, which is now extremely large (24.6 x 24.9 x 26.8 cm). Pt admitted, 2UpRBC given as hgb was found to be 5.8, PM hgb after units found to be 7.8. iron sucrose 300mg IVP given to patient as pt iron panel found to suggest Iron deficiency anemia.         Problem List/Main Diagnoses (system-based):   Inpatient treatment course:   1) Microcytic anemia.   ·  Plan: Pt w/ H&H of 5.7, most recent T&S obtained. 1U PRBC ordered. Less likely to be GI bleed or internal bleed given hx of chronic anemia, normal HR, lack of melenic stools. Additionally, pt w/ normal bilirubin and BUN levels and no signs of blood collecting in testicles or retroperitoneum on CT. Suspecting anemia of chronic disease at this time.   - Protonix 40mg PO for prophylactic measure   - F/u w/ CBC 1 hour post unit   - F/u w/ iron studies   - Transfuse < hgb 7.     Problem/Plan - 2:  ·  Problem: Chronic hydrocele.   ·  Plan: Pt does not c.o pain, says his hydrocele has been increasing in size 1 month after his partial gastrectomy/hemicolectomy in 2019. Pt did not seek medical attention at that time. CT scan did not reveal incarceration of scrotal hernia, no induration or ecchymosis noted in testicular region. S/s c/w chronic hydrocele. Gen-surgery consulted, will appreciate recs.   - Elevate scrotum for now  - No-urgent indications for scrotal surgery at this time   - If changes in pain quality or color appreciated, urgent CT indicated.     Problem/Plan - 3:  ·  Problem: Fall with injury.   ·  Plan: CTH revealed no ICH, pt denies dizziness, headache, LOC, no focal neurological deficits appreciated. Pt denies pain, no other lesions, abrasions, bruises noted. Continue to implement wound care to nasal bridge, clean w/ NS and apply non-adherent dressing for 1-2 days.   - Will continue to observe   - RICE protocol for L hand as below.     Problem/Plan - 4:  ·  Problem: Nondisplaced fracture of phalanx of finger of left hand.   ·  Plan: X-Ray of L hand performed at [facility] revealed small, non-displaced fx of L 5th metacarpal bone. Pt currently s/p finger and wrist splint. Plan is to continue with RICE protocol, avoid NSAIDs i/s/o gastrectomy and possible albeit low suspicion for GI bleed.      CT Head and cervical spine showed generalized cerebral volume loss. No hydrocephalus, midline shit, no acute intracranial hemorrhage or demarcated territorial infarct. Additionally, no acute cervical fracture, facial fracture, or traumatic malalignment found, however multilevel degenerative cervical spondylosis appreciated.     X-RAY of the L wrist showed a 5th metacarpal fx that was splinted, CBC showed a hgb/hct of 5.7/19.7 respectively, no leukocytosis or thrombocytopenia.     CT Abdomen showed post subtotal gastrectomy & right hemicolectomy with a moderate right hydronephrosis and right hydroureter with massive right indirect inguinoscrotal hernia containing nonobstructed bowel, fat and a large 24.6 x 24.9 x 26.8 cm encysted fluid collection.    New medications:   Labs to be followed outpatient:   Exam to be followed outpatient:    77 y.o M with a PMHx of hernia repair s/p subtotal gastrectomy and right hemicolectomy in 2019 presents from [facility] w/ mechanical trip and fall on 9/22/2022. Pt says he was walking outside downtown carrying groceries when he "tripped on level ground" and impacted his L wrist and nasal bridge. Pt denies sx preceding fall such as headache, LOC, dizziness, palpitations, SOB, and says he used his L hand to absorb most of the impact. Pt c.o mild pain in his L wrist but otherwise denies CP, abdominal pain, n/v/d, f/c, dysuria, hematuria, or melena. Of note: In 2019, pt recalls being so anemic he syncopized/went into shock and was in a coma for several days during which time the surgery team @ [facility] resected him for viscous perforation from gastric ulcers 2/2 h pylori infection. He was told to see outpatient provider for elective scrotal hernia repair shortly thereafter but did not follow up, during which time his hydrocele expanded, which is now extremely large (24.6 x 24.9 x 26.8 cm). Pt admitted, 2UpRBC given as hgb was found to be 5.8, PM hgb after units found to be 7.8. iron sucrose 300mg IVP given to patient as pt iron panel found to suggest Iron deficiency anemia.     Problem List/Main Diagnoses (system-based):   Inpatient treatment course:   1) Microcytic anemia: Pt presented with Hgb of 5.7, 2U pRBC given, repeat. Less likely to be GI bleed or internal bleed given hx of chronic anemia, normal HR, lack of melenic stools. Additionally, pt w/ normal bilirubin and BUN levels and no signs of blood collecting in testicles or retroperitoneum on CT. Iron studies indicate Anemia of Chronic Disease. Protonix 40mg PO for prophylactic measure. GI assessed, appointment made for outpatient f/u.     2) Chronic hydrocele: Pt does not c.o pain, says his hydrocele has been increasing in size 1 month after his partial gastrectomy/hemicolectomy in 2019. Pt did not seek medical attention at that time. CT scan did not reveal incarceration of scrotal hernia, no induration or ecchymosis noted in testicular region. S/s c/w chronic hydrocele. Gen-surgery consulted, recommend outpt f/u, appt made.    3) Fall with injury: CTH revealed no ICH, pt denies dizziness, headache, LOC, no focal neurological deficits appreciated. Pt denies pain, no other lesions, abrasions, bruises noted. Continued to implement wound care to nasal bridge, clean w/ NS and apply non-adherent dressing for 1-2 days. RICE protocol for L hand.    4) Nondisplaced fracture of phalanx of finger of left hand: X-Ray of L hand performed at [facility] revealed small, non-displaced fx of L 5th metacarpal bone. Pt currently s/p finger and wrist splint. Plan is to continue with RICE protocol, avoid NSAIDs i/s/o gastrectomy.    Inpatient imaging:  CT Head and cervical spine showed generalized cerebral volume loss. No hydrocephalus, midline shit, no acute intracranial hemorrhage or demarcated territorial infarct. Additionally, no acute cervical fracture, facial fracture, or traumatic malalignment found, however multilevel degenerative cervical spondylosis appreciated.     X-RAY of the L wrist showed a 5th metacarpal fx that was splinted, CBC showed a hgb/hct of 5.7/19.7 respectively, no leukocytosis or thrombocytopenia.     CT Abdomen showed post subtotal gastrectomy & right hemicolectomy with a moderate right hydronephrosis and right hydroureter with massive right indirect inguinoscrotal hernia containing nonobstructed bowel, fat and a large 24.6 x 24.9 x 26.8 cm encysted fluid collection.    New medications:   Iron supplementation _______________________     77 y.o M with a PMHx of hernia repair s/p subtotal gastrectomy and right hemicolectomy in 2019 presents from [facility] w/ mechanical trip and fall on 9/22/2022. Pt says he was walking outside downtown carrying groceries when he "tripped on level ground" and impacted his L wrist and nasal bridge. Pt denies sx preceding fall such as headache, LOC, dizziness, palpitations, SOB, and says he used his L hand to absorb most of the impact. Pt c.o mild pain in his L wrist but otherwise denies CP, abdominal pain, n/v/d, f/c, dysuria, hematuria, or melena. Of note: In 2019, pt recalls being so anemic he syncopized/went into shock and was in a coma for several days during which time the surgery team @ [facility] resected him for viscous perforation from gastric ulcers 2/2 h pylori infection. He was told to see outpatient provider for elective scrotal hernia repair shortly thereafter but did not follow up, during which time his hydrocele expanded, which is now extremely large (24.6 x 24.9 x 26.8 cm). Pt admitted, 2UpRBC given as hgb was found to be 5.8, PM hgb after units found to be 7.8. iron sucrose 300mg IVP given to patient as pt iron panel found to suggest Iron deficiency anemia.     Problem List/Main Diagnoses (system-based):   Inpatient treatment course:   1) Microcytic anemia: Pt presented with Hgb of 5.7, 2U pRBC given, repeat. Less likely to be GI bleed or internal bleed given hx of chronic anemia, normal HR, lack of melenic stools. Additionally, pt w/ normal bilirubin and BUN levels and no signs of blood collecting in testicles or retroperitoneum on CT. Iron studies indicate Anemia of Chronic Disease. Protonix 40mg PO for prophylactic measure. GI assessed, appointment made for outpatient f/u.     2) Chronic hydrocele: Pt does not c.o pain, says his hydrocele has been increasing in size 1 month after his partial gastrectomy/hemicolectomy in 2019. Pt did not seek medical attention at that time. CT scan did not reveal incarceration of scrotal hernia, no induration or ecchymosis noted in testicular region. S/s c/w chronic hydrocele. Gen-surgery consulted, recommend outpt f/u, appt made.    3) Fall with injury: CTH revealed no ICH, pt denies dizziness, headache, LOC, no focal neurological deficits appreciated. Pt denies pain, no other lesions, abrasions, bruises noted. Continued to implement wound care to nasal bridge, clean w/ NS and apply non-adherent dressing for 1-2 days. RICE protocol for L hand.    4) Nondisplaced fracture of phalanx of finger of left hand: X-Ray of L hand performed at [facility] revealed small, non-displaced fx of L 5th metacarpal bone. Pt currently s/p finger and wrist splint. Plan is to continue with RICE protocol, avoid NSAIDs i/s/o gastrectomy.    Inpatient imaging:  CT Head and cervical spine showed generalized cerebral volume loss. No hydrocephalus, midline shit, no acute intracranial hemorrhage or demarcated territorial infarct. Additionally, no acute cervical fracture, facial fracture, or traumatic malalignment found, however multilevel degenerative cervical spondylosis appreciated.     X-RAY of the L wrist showed a 5th metacarpal fx that was splinted, CBC showed a hgb/hct of 5.7/19.7 respectively, no leukocytosis or thrombocytopenia.     CT Abdomen showed post subtotal gastrectomy & right hemicolectomy with a moderate right hydronephrosis and right hydroureter with massive right indirect inguinoscrotal hernia containing nonobstructed bowel, fat and a large 24.6 x 24.9 x 26.8 cm encysted fluid collection.    New medications:   Iron supplementation 160 mg ER 1 tablet Qd     77 y.o M with a PMHx of hernia repair s/p subtotal gastrectomy and right hemicolectomy in 2019 presents from [facility] w/ mechanical trip and fall on 9/22/2022. Pt says he was walking outside downtown carrying groceries when he "tripped on level ground" and impacted his L wrist and nasal bridge. Pt denies sx preceding fall such as headache, LOC, dizziness, palpitations, SOB, and says he used his L hand to absorb most of the impact. Pt c.o mild pain in his L wrist but otherwise denies CP, abdominal pain, n/v/d, f/c, dysuria, hematuria, or melena. Of note: In 2019, pt recalls being so anemic he syncopized/went into shock and was in a coma for several days during which time the surgery team @ [facility] resected him for viscous perforation from gastric ulcers 2/2 h pylori infection. He was told to see outpatient provider for elective scrotal hernia repair shortly thereafter but did not follow up, during which time his hydrocele expanded, which is now extremely large (24.6 x 24.9 x 26.8 cm). Pt admitted, 2UpRBC given as hgb was found to be 5.8, PM hgb after units found to be 7.8. iron sucrose 300mg IVP given to patient as pt iron panel found to suggest Iron deficiency anemia.     Problem List/Main Diagnoses (system-based):   Inpatient treatment course:   1) Microcytic anemia: Pt presented with Hgb of 5.7, 2U pRBC given, repeat. Less likely to be GI bleed or internal bleed given hx of chronic anemia, normal HR, lack of melenic stools. Additionally, pt w/ normal bilirubin and BUN levels and no signs of blood collecting in testicles or retroperitoneum on CT. Iron studies indicate Anemia of Chronic Disease. Protonix 40mg PO for prophylactic measure. GI assessed, appointment made for outpatient f/u.     2) Chronic hydrocele: Pt does not c.o pain, says his hydrocele has been increasing in size 1 month after his partial gastrectomy/hemicolectomy in 2019. Pt did not seek medical attention at that time. CT scan did not reveal incarceration of scrotal hernia, no induration or ecchymosis noted in testicular region. S/s c/w chronic hydrocele. Gen-surgery consulted, recommend outpt f/u, appt made.    3) Fall with injury: CTH revealed no ICH, pt denies dizziness, headache, LOC, no focal neurological deficits appreciated. Pt denies pain, no other lesions, abrasions, bruises noted. Continued to implement wound care to nasal bridge, clean w/ NS and apply non-adherent dressing for 1-2 days. RICE protocol for L hand.    4) Nondisplaced fracture of phalanx of finger of left hand: X-Ray of L hand performed at [facility] revealed small, non-displaced fx of L 5th metacarpal bone. Pt currently s/p finger and wrist splint. Plan is to continue with RICE protocol, avoid NSAIDs i/s/o gastrectomy.    Inpatient imaging:  CT Head and cervical spine showed generalized cerebral volume loss. No hydrocephalus, midline shit, no acute intracranial hemorrhage or demarcated territorial infarct. Additionally, no acute cervical fracture, facial fracture, or traumatic malalignment found, however multilevel degenerative cervical spondylosis appreciated.     X-RAY of the L wrist showed a 5th metacarpal fx that was splinted, CBC showed a hgb/hct of 5.7/19.7 respectively, no leukocytosis or thrombocytopenia.     CT Abdomen showed post subtotal gastrectomy & right hemicolectomy with a moderate right hydronephrosis and right hydroureter with massive right indirect inguinoscrotal hernia containing nonobstructed bowel, fat and a large 24.6 x 24.9 x 26.8 cm encysted fluid collection.    New medications:   Iron supplementation 160 mg ER 1 tablet Qd  Miralax 17g Qd PRN for constipation     77 y.o M with a PMHx of hernia repair s/p subtotal gastrectomy and right hemicolectomy in 2019 presents from [facility] w/ mechanical trip and fall on 9/22/2022. Pt says he was walking outside downtown carrying groceries when he "tripped on level ground" and impacted his L wrist and nasal bridge. Pt denies sx preceding fall such as headache, LOC, dizziness, palpitations, SOB, and says he used his L hand to absorb most of the impact. Pt c.o mild pain in his L wrist but otherwise denies CP, abdominal pain, n/v/d, f/c, dysuria, hematuria, or melena. Of note: In 2019, pt recalls being so anemic he syncopized/went into shock and was in a coma for several days during which time the surgery team @ [facility] resected him for viscous perforation from gastric ulcers 2/2 h pylori infection. He was told to see outpatient provider for elective scrotal hernia repair shortly thereafter but did not follow up, during which time his hydrocele expanded, which is now extremely large (24.6 x 24.9 x 26.8 cm). Pt admitted, 2UpRBC given as hgb was found to be 5.8, PM hgb after units found to be 7.8. iron sucrose 300mg IVP given to patient as pt iron panel found to suggest Iron deficiency anemia.     Problem List/Main Diagnoses (system-based):   Inpatient treatment course:   1) Microcytic anemia: Pt presented with Hgb of 5.7, 2U pRBC given, repeat. Less likely to be GI bleed or internal bleed given hx of chronic anemia, normal HR, lack of melenic stools. Additionally, pt w/ normal bilirubin and BUN levels and no signs of blood collecting in testicles or retroperitoneum on CT. Iron studies indicate Anemia of Chronic Disease. Protonix 40mg PO for prophylactic measure. GI assessed, appointment made for outpatient f/u.     2) Chronic hydrocele: Pt does not c.o pain, says his hydrocele has been increasing in size 1 month after his partial gastrectomy/hemicolectomy in 2019. Pt did not seek medical attention at that time. CT scan did not reveal incarceration of scrotal hernia, no induration or ecchymosis noted in testicular region. S/s c/w chronic hydrocele. Gen-surgery consulted, recommend outpt f/u, appt made.    3) Fall with injury: CTH revealed no ICH, pt denies dizziness, headache, LOC, no focal neurological deficits appreciated. Pt denies pain, no other lesions, abrasions, bruises noted. Continued to implement wound care to nasal bridge, clean w/ NS and apply non-adherent dressing for 1-2 days. RICE protocol for L hand.    4) Nondisplaced fracture of phalanx of finger of left hand: X-Ray of L hand performed at [facility] revealed small, non-displaced fx of L 5th metacarpal bone. Pt currently s/p finger and wrist splint. Plan is to continue with RICE protocol, avoid NSAIDs i/s/o gastrectomy.    Inpatient imaging:  CT Head and cervical spine showed generalized cerebral volume loss. No hydrocephalus, midline shit, no acute intracranial hemorrhage or demarcated territorial infarct. Additionally, no acute cervical fracture, facial fracture, or traumatic malalignment found, however multilevel degenerative cervical spondylosis appreciated.     X-RAY of the L wrist showed a 5th metacarpal fx that was splinted, CBC showed a hgb/hct of 5.7/19.7 respectively, no leukocytosis or thrombocytopenia.     CT Abdomen showed post subtotal gastrectomy & right hemicolectomy with a moderate right hydronephrosis and right hydroureter with massive right indirect inguinoscrotal hernia containing nonobstructed bowel, fat and a large 24.6 x 24.9 x 26.8 cm encysted fluid collection.    New medications:   Iron supplementation 325 mg tablet Qd  Miralax 17g Qd PRN for constipation

## 2022-09-23 NOTE — CONSULT NOTE ADULT - SUBJECTIVE AND OBJECTIVE BOX
Patient is a 77y old  Male who presents with a chief complaint of "I fell on my nose" (23 Sep 2022 10:46)        HPI:  77 y.o M with a PMHx of hernia repair s/p subtotal gastrectomy and right hemicolectomy in 2019 presents from [facility] w/ mechanical trip and fall earlier today 2022. Pt says he was walking outside downtown carrying groceries when he "tripped on level ground" and impacted his L wrist and nasal bridge. Pt denies sx preceding fall such as headache, LOC, dizziness, palpitations, SOB, and says he used his L hand to absorb most of the impact. Pt c.o mild pain in his L wrist but otherwise denies CP, abdominal pain, n/v/d, f/c, dysuria, hematuria, or melena.   Of note: In 2019, pt recalls being so anemic he syncopized/went into shock and was in a coma for several days during which time the surgery team @ [facility] resected him for viscous perforation from gastric ulcers 2/2 h pylori infection. He was told to see outpatient provider for elective scrotal hernia repair shortly thereafter but did not follow up, during which time his hydrocele expanded.     ED Course:   ED Vitals: Temp: 98.4, HR: 75, BP: 163/85, o2%: 100% RA.   Pt arrived to [facility] in AM of 2022 after falling while carrying groceries. CT Head and cervical spine showed generalized cerebral volume loss. No hydrocephalus, midline shit, no acute intracranial hemorrhage or demarcated territorial infarct. Additionally, no acute cervical fracture, facial fracture, or traumatic malalignment found, however multilevel degenerative cervical spondylosis appreciated. X-RAY of the L wrist showed a 5th metacarpal fx that was splinted, CBC showed a hgb/hct of 5.7/19.7 respectively, no leukocytosis or thrombocytopenia. CT Abdomen showed post subtotal gastrectomy & right hemicolectomy with a moderate right hydronephrosis and right hydroureter with massive right indirect inguinoscrotal hernia containing nonobstructed bowel, fat and a large 24.6 x 24.9 x 26.8 cm encysted fluid collection. Pt admitted to [facility] for further evaluation of massive scrotal hernia and anemia requiring blood transfusion. (22 Sep 2022 10:36)      PAST MEDICAL & SURGICAL HISTORY:  H/O hernia repair      H pylori ulcer      Gastritis      Anemia      S/P hernia repair          MEDICATIONS  (STANDING):  influenza  Vaccine (HIGH DOSE) 0.7 milliLiter(s) IntraMuscular once  iron sucrose IVPB 300 milliGRAM(s) IV Intermittent once  pantoprazole    Tablet 40 milliGRAM(s) Oral before breakfast    MEDICATIONS  (PRN):  acetaminophen     Tablet .. 650 milliGRAM(s) Oral every 6 hours PRN Temp greater or equal to 38C (100.4F), Mild Pain (1 - 3)               FAMILY HISTORY:  No pertinent family history in first degree relatives      CBC Full  -  ( 23 Sep 2022 09:45 )  WBC Count : 3.39 K/uL  RBC Count : 3.72 M/uL  Hemoglobin : 7.1 g/dL  Hematocrit : 24.1 %  Platelet Count - Automated : 332 K/uL  Mean Cell Volume : 64.8 fl  Mean Cell Hemoglobin : 19.1 pg  Mean Cell Hemoglobin Concentration : 29.5 gm/dL  Auto Neutrophil # : 2.66 K/uL  Auto Lymphocyte # : 0.38 K/uL  Auto Monocyte # : 0.20 K/uL  Auto Eosinophil # : 0.15 K/uL  Auto Basophil # : 0.00 K/uL  Auto Neutrophil % : 78.5 %  Auto Lymphocyte % : 11.2 %  Auto Monocyte % : 6.0 %  Auto Eosinophil % : 4.3 %  Auto Basophil % : 0.0 %          136  |  103  |  14  ----------------------------<  90  4.0   |  25  |  0.97    Ca    8.6      23 Sep 2022 09:45  Phos  2.8       Mg     2.0         TPro  7.1  /  Alb  3.6  /  TBili  0.5  /  DBili  x   /  AST  14  /  ALT  7<L>  /  AlkPhos  139<H>        Urinalysis Basic - ( 22 Sep 2022 01:23 )    Color: Yellow / Appearance: Clear / S.015 / pH: x  Gluc: x / Ketone: NEGATIVE  / Bili: NEGATIVE / Urobili: 0.2 E.U./dL   Blood: x / Protein: NEGATIVE mg/dL / Nitrite: NEGATIVE   Leuk Esterase: NEGATIVE / RBC: x / WBC x   Sq Epi: x / Non Sq Epi: x / Bacteria: x          Radiology :    < from: Xray Hand 3 Views, Left (22 @ 02:19) >  ACC: [id number] EXAM:  XR HAND MIN 3 VIEWS LT                          PROCEDURE DATE:  2022          INTERPRETATION:  Clinical history/reason for exam: Trauma.    3 views.    Findings/  impression: Soft tissue swelling, fifth metacarpal neck nondisplaced   fracture. First metacarpal carpal and triscaphe degenerative changes.    < from: CT Head No Cont (22 @ 03:31) >  ACC: [id number] EXAM:  CT CERVICAL SPINE                        ACC: [id number] EXAM:  CT MAXILLOFACIAL                        ACC: [id number] EXAM:  CT BRAIN                          PROCEDURE DATE:  2022          INTERPRETATION:  CT OF THE HEAD, CERVICAL SPINE AND FACIAL BONES WITHOUT   CONTRAST    INDICATION:  fall    TECHNIQUE: An axial noncontrast CT scan of the head, cervical spine and   facial bones was performed. Sagittal and coronal reformatted images were   also obtained.    CONTRAST: None    COMPARISON:  None    FINDINGS:  CT head:  There is ventricular and sulcal prominence consistent with generalized   age related cerebral volume loss. No midline shift or hydrocephalus. No   acute intracranial hemorrhage. No acute transcortical infarct. Globes   symmetric and intact. Paranasal sinuses and mastoids are well-pneumatized.    CT cervical spine:  Preservation of lordosis. No jumped or perched facets. No compression   fracture. Multilevel loss of intervertebral disc height. No listhesis.   Odontoid process is intact. No prevertebral edema. The craniocervical   junction is patent. Atlantoaxial articulation is intact. Occipital   condyles are intact. Multilevel degenerative neural foraminal narrowing   secondary to uncovertebral and facet osteoarthrosis. Multilevel   degenerative canal stenosis most pronounced at the C5-6 level where there   is moderate to severe canal stenosis secondary to disc osteophyte   complex. Mild arthrosclerotic calcification involving the right and left   carotid bifurcation.    CT facial bone:  Nasal bones and bony nasal septum are intact. Zygomatic arches, pterygoid   plates, mandible and maxilla are intact. No air-fluid levels. The globes   are symmetric and intact. No retrobulbar hematoma. No intraglobal   hemorrhage. Walls of the orbits and paranasal sinuses are intact. Carmen   catalina and fovea ethmoidalis are intact.    IMPRESSION:  Generalized cerebral volume loss. No hydrocephalus, midline shift, acute   intracranial hemorrhage ordemarcated territorial infarct.    No acute cervical fracture or traumatic malalignment.    Multilevel degenerative cervical spondylosis.    No acute facial bone fracture.      < from: CT Abdomen and Pelvis w/ IV Cont (22 @ 03:34) >  ACC: [id number] EXAM:  CT ABDOMEN AND PELVIS IC                          PROCEDURE DATE:  2022          INTERPRETATION:  Attending over read. Agree with the below report with   following modifications. Mild intrahepatic biliary dilatation. Status   post subtotal gastrectomy. Possibly status post right hemicolectomy.   Moderate right hydronephrosis and right hydroureter. Dilated right ureter   is seen down to the right inferior pelvis. Huge right indirect inguino   scrotal hernia containing nonobstructed bowel, fat and  a large 24.6 x   24.9 x 26.8 cm encysted fluid collection-differential diagnosis includes   hydrocele. Scrotal edema. Possible normal left testis within the left   scrotal sac. Right testis is probably compressed, situated anterior to   and contiguous with the encysted fluid collection. The scrotal sac    extends down inferiorly to the level of the knees.      Vital Signs Last 24 Hrs  T(C): 36.8 (23 Sep 2022 09:05), Max: 37.2 (22 Sep 2022 21:54)  T(F): 98.2 (23 Sep 2022 09:05), Max: 99 (22 Sep 2022 21:54)  HR: 69 (23 Sep 2022 09:05) (61 - 69)  BP: 128/77 (23 Sep 2022 09:05) (123/62 - 143/68)  BP(mean): --  RR: 18 (23 Sep 2022 09:05) (18 - 18)  SpO2: 99% (23 Sep 2022 09:05) (98% - 99%)    Parameters below as of 23 Sep 2022 09:05  Patient On (Oxygen Delivery Method): room air            REVIEW OF SYSTEMS:  per HPI         Physical Exam:  77 y o man lying in semi Hess's position , awake , alert , no acute complaints     Head : normocephalic , atraumatic    Eyes : PERRLA , EOMI , no nystagmus , sclera anicteric    ENT : nasal discharge , uvula midline , no oropharyngeal erythema / exudate    Neck : supple , negative JVD , negative carotid bruits , no thyromegaly    Chest : CTA bilaterally , neg wheeze / rhonchi / rales / crackles / egophany    Cardiovascular: regular rate and rhythm , neg murmurs / rubs / gallops    Abdomen : soft , non distended , non tender to palpation in all 4 quadrants , negative rebound / guarding , normal bowel sounds    Extremities : WWP , neg cyanosis /clubbing / edema , negative calf tenderness to palpation , negative Carlos's sign    Musculoskeletal : L5 th digit/mcp bruise      Neurologic Exam:    Alert and oriented x 3     Cranial Nerves:     II :                         pupils equal , round and reactive to light , visual fields intact   III/ IV/VI :              extraocular movements intact , neg nystagmus , neg ptosis  V :                        facial sensation intact , V1-3 normal  VII :                      face symmetric , no droop , normal eye closure and smile  VIII :                     hearing intact to finger rub bilaterally  IX and X :             no hoarseness , gag intact , palate/ uvula rise symmetrically  XI :                       SCM / trapezius strength intact bilateral  XII :                      no tongue deviation    Motor Exam:          Right UE:               no focal weakness ,  > 3+/5 throughout  , no drift                                Left UE:                 no focal weakness,  > 3+/5 throughout  , no drift       Right LE:                no focal weakness,  > 3+/5 throughout      Left LE:                  no focal weakness,  > 3+/5 throughout           Sensation:         intact to light touch x 4 extremities                         no neglect or extinction on double simultaneous testing                       DTR :                     biceps/brachioradialis : equal                                              patella/ankle : equal                                                                               neg Babinski     Gait :  not tested              PM&R Impression :     1) s/ fall     2) no focal neurologic deficits       Recommendations / Plan :     1) Physical / Occupational therapy focusing on therapeutic exercises , equipment evaluation , bed mobility/transfer out of bed evaluation , progressive ambulation with assistive devices prn .    2) Anticipated Disposition Plan / Recs  :   pending functional progress          
HPI: 77-year-old male with PMHx of PUD s/p partial gastrectomy (2019) and R inguinal hernia s/p repair (2019) presented after a mechanical fall forward.  General Surgery consulted for severe inguinal hernia.  Patient tripped and denies any lightheadedness and head strike.     The surgeries were performed simultaneously via open midline access by Dr. Celia Eastman at [facility] after the patient presented to the hospital regarding concerns for 80lb weight loss.  Cancer workup was performed during that inpatient stay, and determined to be negative per patient.  The post-operative recovery required a 3-week stay in a rehabilitation facility.  He reports the hernia began to reappear ~4 weeks s/p repair after a dry heaving episode and he felt "something pop;" however, he was lost to follow-up because he was required to get a PCP and then a new referral for surgical re-evaluation. The hernia continued to worsen over the years without attention.  He denies any pain, nausea, vomiting, diarrhea, constipation, and dysuria.  He endorses abdominal "discomfort," but the chief complaint regarding the hernia is difficulty getting out of bed and ambulating.  He denies ever having an EGD or C-scope.       ROS   Constitutional: (-) fever, (-) chills   Eyes/ENT: (-) blurry vision, (-) epistaxis, (-) sore throat   Cardiovascular: (-) chest pain, (-) syncope   Respiratory: (-) cough, (+) shortness of breath on exertion   Gastrointestinal: (-) abdominal pain, (-) vomiting, (-) diarrhea, (-) constipation, (+) passing flatus   Musculoskeletal: (-) neck pain, (-) back pain, (-) joint pain   Integumentary: (-) rash, (-) edema   Neurological: (-) headache, (+) woozy/dizzy intermittently       PAST MEDICAL & SURGICAL HISTORY:   PMHx:     - Peptic Ulcer Disease (gastric ulceration)   - R Inguinal Hernia     PSHx:   - Partial Gastrectomy (2019)   - Right Inguinal Hernia Repair (2019)       HOME MEDICATIONS:  None     MEDICATIONS  (STANDING):     MEDICATIONS  (PRN):   acetaminophen     Tablet .. 650 milliGRAM(s) Oral every 6 hours PRN Temp greater or equal to 38C (100.4F), Mild Pain (1 - 3)     Allergies:  NKDA     Intolerances:  None     FAMILY HISTORY:  Denies any family history of cancer   - Sister:  Multiple CVAs       SOCIAL HISTORY:  Patient is a retired Building Super and resides alone.  He endorses smoking for ~10 years at a rate of 1-2 packs/week; however, he quit 15-20 years ago.  He denies consumption of EtOH and use of illicit/recreational drugs.       T(C): 36.7 (09-22-22 @ 09:54), Max: 36.9 (09-22-22 @ 08:29)   HR: 69 (09-22-22 @ 09:54) (58 - 84)   BP: 143/75 (09-22-22 @ 09:54) (129/66 - 163/85)   RR: 18 (09-22-22 @ 09:54) (16 - 18)   SpO2: 99% (09-22-22 @ 09:54) (99% - 100%)     GENERAL: NAD, Resting comfortably in bed, awake, opens eyes spontaneously   HEENT: NCAT, MMM, Normal conjunctiva, Mild facial bruising   RESP: Nonlabored breathing on room air, No respiratory distress   CARD: Normal rate, Normal peripheral perfusion   GI: Soft, ND, NT, No guarding, No rebound tenderness   GENITOURINARY:  extremely large scrotal hernia with dilated tortuous superficial veins on the inferior scrotum, nontender, firm with milk skin edematous changes on anterior R of scrotum   EXTREM: WWP, No edema, No gross deformity of extremities, RUE with ecchymoses and bandage with ACE    SKIN: No rashes   NEURO: Awake and alert, No focal motor or sensory deficits   PSYCH: Affect and characteristics of appearance, verbalizations, and behaviors are appropriate           LABS:                   5.8     3.97  )-----------( 367      ( 22 Sep 2022 09:49 )              19.7      09-22     137  |  105  |  17   ----------------------------<  83   4.1   |  26  |  1.03       Ca    8.7      22 Sep 2022 01:23  TPro  7.7  /  Alb  3.0<L>  /  TBili  0.3  /  DBili  x   /  AST  24  /  ALT  13  /  AlkPhos  133<H>  09-22     PT/INR - ( 22 Sep 2022 02:24 )   PT: 12.5 sec;   INR: 1.07      PTT - ( 22 Sep 2022 02:24 )  PTT:32.0 sec       LIVER FUNCTIONS - ( 22 Sep 2022 01:23 )   Alb: 3.0 g/dL / Pro: 7.7 g/dL / ALK PHOS: 133 U/L / ALT: 13 U/L / AST: 24 U/L / GGT: x              RADIOLOGY & ADDITIONAL STUDIES:   CT Abdomen and Pelvis w/ IV Cont:    ACC: [id number] EXAM:  CT ABDOMEN AND PELVIS IC                           PROCEDURE DATE:  09/22/2022       INTERPRETATION:  Attending over read. Agree with the below report with following modifications. Mild intrahepatic biliary dilatation. Status post subtotal gastrectomy. Possibly status post right hemicolectomy.      Moderate right hydronephrosis and right hydroureter. Dilated right ureter is seen down to the right inferior pelvis. Huge right indirect inguino scrotal hernia containing nonobstructed bowel, fat and a large 24.6 x 24.9 x 26.8 cm encysted fluid collection-differential diagnosis includes hydrocele. Scrotal edema. Possible normal left testis within the left      scrotal sac. Right testis is probably compressed, situated anterior to and contiguous with the encysted fluid collection. The scrotal sac  extends down inferiorly to the level of the knees.         MD Loree         [facility] RADIOLOGIST PRELIMINARY REPORT     Initial report created on 9/22/2022 4:22:08 AM EDT     PROCEDURE INFORMATION:   Exam: CT Abdomen And Pelvis With Contrast   Exam date and time: 9/22/2022 3:13 AM   Age: 77 years old   Clinical indication: Other: Eval large inguinal hernia     TECHNIQUE:   Imaging protocol: Computed tomography of the abdomen and pelvis with    contrast.       COMPARISON:   No relevant prior studies available.     FINDINGS:     Lungs: There are dependent hypoventilatory changes bilaterally. Lung bases are otherwise clear. No pleural effusions.   Liver: Unremarkable.   Gallbladder and bile ducts: The common bile duct is mildly dilated, measuring up to 8 mm in maximum diameter. There is mild central intrahepatic biliary ductal dilatation. The gallbladder is nondilated.   Pancreas: Unremarkable.   Spleen: Unr mildly enlarged, measuring 13.1 cm maximum diameter. Otherwise unremarkable.   Adrenal glands: Unremarkable.   Kidneys and ureters: There is moderate right hydroureteronephrosis. There is focal narrowing of the distal right ureter as it courses into the superior margin of the large right inguinal hernia, likely due to extrinsic mass effect.   The left renal collecting system and left ureter are normal in caliber. No suspicious renal masses.   Stomach and bowel: Postsurgical changes are noted in the stomach and in loops of bowel in the right upper and right lower quadrants. The stomach is nondilated. The small and large bowel are normal in caliber. There is colonic diverticulosis without evidence of diverticulitis.   Appendix: Presumed surgically absent.   Intraperitoneal space: Unremarkable. No ascites, fluid collection, or pneumoperitoneum.   Retroperitoneal space: Unremarkable. No retroperitoneal collection or mass.   Vasculature: Mild atherosclerotic vascular calcifications. Normal caliber abdominal aorta.   Lymph nodes: No pathologically enlarged lymph nodes.   Urinary bladder: Unremarkable.   Reproductive: There is a massive thin walled cystic structure within the right inguinal hernia which measures 27 cm CC x 25 cm AP x 24 cm TRV, a suspected massive hydrocele. There is diffuse severe scrotal edema. The prostate gland is mildly enlarged.   Bones/joints: Degenerative changes. No suspicious osseous lesions.   Soft tissues: There is a massive right inguinal hernia containing numerous loops of nonobstructed small bowel. The hernia sac measures approximately 40 cm in greatest diameter.       IMPRESSION:   1. Massive right inguinal hernia containing numerous loops of nonobstructed small bowel and a large cystic structure measuring up to 27 cm, a suspected massive hydrocele.   2. Moderate right hydroureteronephrosis. The right ureter is extrinsically compressed as it courses into the superior aspect of the right inguinal hernia sac.   3. Mild biliary ductal dilatation of uncertain etiology. This can be correlated with liver function tests and further assessed with MRCP as clinically warranted.   4. Additional incidental/nonemergent findings are discussed in the body of the report.     --- End of Report ---     MEHREEN YUN MD; Attending Radiologist     This document has been electronically signed. Sep 22 2022  7:17AM (09-22-22 @ 03:34) 
GASTROENTEROLOGY CONSULT NOTE  HPI:  77 y.o M with a PMHx of hernia repair s/p subtotal gastrectomy and right hemicolectomy in 2019 presents from [facility] w/ mechanical trip and fall earlier today 9/22/2022. Pt says he was walking outside downtown carrying groceries when he "tripped on level ground" and impacted his L wrist and nasal bridge. Pt denies sx preceding fall such as headache, LOC, dizziness, palpitations, SOB, and says he used his L hand to absorb most of the impact. Pt c.o mild pain in his L wrist but otherwise denies CP, abdominal pain, n/v/d, f/c, dysuria, hematuria, or melena.   Of note: In 2019, pt recalls being so anemic he syncopized/went into shock and was in a coma for several days during which time the surgery team @ [facility] resected him for viscous perforation from gastric ulcers 2/2 h pylori infection. He was told to see outpatient provider for elective scrotal hernia repair shortly thereafter but did not follow up, during which time his hydrocele expanded.     ED Course:   ED Vitals: Temp: 98.4, HR: 75, BP: 163/85, o2%: 100% RA.   Pt arrived to [facility] in AM of 9/22/2022 after falling while carrying groceries. CT Head and cervical spine showed generalized cerebral volume loss. No hydrocephalus, midline shit, no acute intracranial hemorrhage or demarcated territorial infarct. Additionally, no acute cervical fracture, facial fracture, or traumatic malalignment found, however multilevel degenerative cervical spondylosis appreciated. X-RAY of the L wrist showed a 5th metacarpal fx that was splinted, CBC showed a hgb/hct of 5.7/19.7 respectively, no leukocytosis or thrombocytopenia. CT Abdomen showed post subtotal gastrectomy & right hemicolectomy with a moderate right hydronephrosis and right hydroureter with massive right indirect inguinoscrotal hernia containing nonobstructed bowel, fat and a large 24.6 x 24.9 x 26.8 cm encysted fluid collection. Pt admitted to [facility] for further evaluation of massive scrotal hernia and anemia requiring blood transfusion. (22 Sep 2022 10:36)    GI consulted for anemia. Patient seen and examined at bedside.     Allergies    No Known Allergies    Intolerances      Home Medications:  Maalox Antacid Antigas Regular Strength oral suspension: 10 milliliter(s) orally 4 times a day (after meals and at bedtime), As Needed (22 Sep 2022 12:05)    MEDICATIONS:  MEDICATIONS  (STANDING):  influenza  Vaccine (HIGH DOSE) 0.7 milliLiter(s) IntraMuscular once  pantoprazole    Tablet 40 milliGRAM(s) Oral before breakfast    MEDICATIONS  (PRN):  acetaminophen     Tablet .. 650 milliGRAM(s) Oral every 6 hours PRN Temp greater or equal to 38C (100.4F), Mild Pain (1 - 3)    PAST MEDICAL & SURGICAL HISTORY:  H/O hernia repair      H pylori ulcer      Gastritis      Anemia      S/P hernia repair        FAMILY HISTORY:  No pertinent family history in first degree relatives      SOCIAL HISTORY:  Tobacco: [ ] Current, [ ] Former, [ ] Never; Pack Years:  Alcohol:  Illicit Drugs:    REVIEW OF SYSTEMS:  CONSTITUTIONAL: No weakness, fevers or chills  HEENT: No visual changes; No vertigo or throat pain   NECK: No pain or stiffness  RESPIRATORY: No cough, wheezing, hemoptysis; No shortness of breath  CARDIOVASCULAR: No chest pain or palpitations  GASTROINTESTINAL: As above.  GENITOURINARY: No dysuria, frequency or hematuria  NEUROLOGICAL: No numbness or weakness  SKIN: No itching, burning, rashes, or lesions   All other 10 review of systems is negative unless indicated above.    Vital Signs Last 24 Hrs  T(C): 36.7 (22 Sep 2022 15:26), Max: 36.9 (22 Sep 2022 08:29)  T(F): 98.1 (22 Sep 2022 15:26), Max: 98.4 (22 Sep 2022 08:29)  HR: 61 (22 Sep 2022 15:26) (58 - 84)  BP: 123/62 (22 Sep 2022 15:26) (123/62 - 163/85)  BP(mean): --  RR: 18 (22 Sep 2022 15:26) (16 - 18)  SpO2: 99% (22 Sep 2022 15:26) (99% - 100%)    Parameters below as of 22 Sep 2022 15:26  Patient On (Oxygen Delivery Method): room air          PHYSICAL EXAM:    General: in no acute distress  Eyes: Anicteric sclerae, moist conjunctivae  HENT: Moist mucous membranes  Neck: Trachea midline, supple  Lungs: Normal respiratory effort, no intercostal retractions  Cardiovascular: RRR  Abdomen: Soft, non-tender non-distended; No rebound or guarding; massive scrotal edema  Extremities: Normal range of motion, No clubbing, cyanosis or edema  Neurological: Alert and oriented x3  Skin: Warm and dry. No obvious rash    LABS:                        6.3    3.75  )-----------( 345      ( 22 Sep 2022 16:45 )             21.4     09-22    138  |  105  |  14  ----------------------------<  96  4.0   |  25  |  0.87    Ca    8.8      22 Sep 2022 09:49  Phos  2.6     09-22  Mg     2.1     09-22    TPro  7.1  /  Alb  3.6  /  TBili  0.5  /  DBili  x   /  AST  14  /  ALT  7<L>  /  AlkPhos  139<H>  09-22        PT/INR - ( 22 Sep 2022 02:24 )   PT: 12.5 sec;   INR: 1.07          PTT - ( 22 Sep 2022 02:24 )  PTT:32.0 sec    RADIOLOGY & ADDITIONAL STUDIES:     Reviewed

## 2022-09-23 NOTE — PROGRESS NOTE ADULT - PROBLEM SELECTOR PLAN 3
Pt does not c.o pain, says his hydrocele has been increasing in size 1 month after his partial gastrectomy/hemicolectomy in 2019. Pt did not seek medical attention at that time. CT scan did not reveal incarceration of scrotal hernia, no induration or ecchymosis noted in testicular region. S/s c/w chronic hydrocele. Gen-surgery consulted, will appreciate recs.   - Elevate scrotum for now  - No-urgent indications for scrotal surgery at this time   - If changes in pain quality or color appreciated, urgent CT indicated

## 2022-09-23 NOTE — DISCHARGE NOTE NURSING/CASE MANAGEMENT/SOCIAL WORK - PATIENT PORTAL LINK FT
You can access the FollowMyHealth Patient Portal offered by [facility] by registering at the following website: [email or web address]. By joining [facility]’s FollowMyHealth portal, you will also be able to view your health information using other applications (apps) compatible with our system.

## 2022-09-23 NOTE — DISCHARGE NOTE PROVIDER - NSDCMRMEDTOKEN_GEN_ALL_CORE_FT
Maalox Antacid [place]igas Regular Strength oral suspension: 10 milliliter(s) orally 4 times a day (after meals and at bedtime), As Needed   ferrous sulfate 160 mg (50 mg elemental iron) oral tablet, extended release: 1 tab(s) orally once a day   Maalox Antacid Antigas Regular Strength oral suspension: 10 milliliter(s) orally 4 times a day (after meals and at bedtime), As Needed   ferrous sulfate 160 mg (50 mg elemental iron) oral tablet, extended release: 1 tab(s) orally once a day   Maalox Antacid Antigas Regular Strength oral suspension: 10 milliliter(s) orally 4 times a day (after meals and at bedtime), As Needed  MiraLax oral powder for reconstitution: 17 gram(s) orally once a day, As Needed for constipation. Remember that the iron supplements we are asking you to take can sometimes cause constipation, this is normal.    FeroSul 325 mg (65 mg elemental iron) oral tablet: 1 tab(s) orally once a day   Maalox Antacid Antigas Regular Strength oral suspension: 10 milliliter(s) orally 4 times a day (after meals and at bedtime), As Needed  MiraLax oral powder for reconstitution: 17 gram(s) orally once a day, As Needed for constipation. Remember that the iron supplements we are asking you to take can sometimes cause constipation, this is normal.

## 2022-09-23 NOTE — OCCUPATIONAL THERAPY INITIAL EVALUATION ADULT - RANGE OF MOTION EXAMINATION, UPPER EXTREMITY
LUE grossly assessed 2/2 splint/ACE wrap, able to perform digit flex/ext, and full elbow and shoulder AROM/bilateral UE Active ROM was WNL (within normal limits)

## 2022-09-23 NOTE — DISCHARGE NOTE PROVIDER - NSDCFUSCHEDAPPT_GEN_ALL_CORE_FT
French Fowler  [facility]  [address] [facility] [address] [facility]  Scheduled Appointment: 10/10/2022     French Fowler  [facility]  [address]  Scheduled Appointment: 10/10/2022    [facility]  [address]  Scheduled Appointment: 10/20/2022

## 2022-09-27 DIAGNOSIS — M47.812 SPONDYLOSIS WITHOUT MYELOPATHY OR RADICULOPATHY, CERVICAL REGION: ICD-10-CM

## 2022-09-27 DIAGNOSIS — Z91.19 PATIENT'S NONCOMPLIANCE WITH OTHER MEDICAL TREATMENT AND REGIMEN: ICD-10-CM
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
## 2022-09-27 DIAGNOSIS — I86.1 SCROTAL VARICES: ICD-10-CM

## 2022-09-27 DIAGNOSIS — S00.31XA ABRASION OF NOSE, INITIAL ENCOUNTER: ICD-10-CM

## 2022-09-27 DIAGNOSIS — K40.91 UNILATERAL INGUINAL HERNIA, WITHOUT OBSTRUCTION OR GANGRENE, RECURRENT: ICD-10-CM

## 2022-09-27 DIAGNOSIS — R74.8 ABNORMAL LEVELS OF OTHER SERUM ENZYMES: ICD-10-CM

## 2022-09-27 DIAGNOSIS — S50.12XA CONTUSION OF LEFT FOREARM, INITIAL ENCOUNTER: ICD-10-CM

## 2022-09-27 DIAGNOSIS — Z20.822 CONTACT WITH AND (SUSPECTED) EXPOSURE TO COVID-19: ICD-10-CM

## 2022-09-27 DIAGNOSIS — Z91.81 HISTORY OF FALLING: ICD-10-CM

## 2022-09-27 DIAGNOSIS — R21 RASH AND OTHER NONSPECIFIC SKIN ERUPTION: ICD-10-CM

## 2022-09-27 DIAGNOSIS — D50.9 IRON DEFICIENCY ANEMIA, UNSPECIFIED: ICD-10-CM

## 2022-09-27 DIAGNOSIS — Z90.3 ACQUIRED ABSENCE OF STOMACH [PART OF]: ICD-10-CM

## 2022-09-27 DIAGNOSIS — D63.8 ANEMIA IN OTHER CHRONIC DISEASES CLASSIFIED ELSEWHERE: ICD-10-CM

## 2022-09-27 DIAGNOSIS — Z90.49 ACQUIRED ABSENCE OF OTHER SPECIFIED PARTS OF DIGESTIVE TRACT: ICD-10-CM

## 2022-09-27 DIAGNOSIS — N13.30 UNSPECIFIED HYDRONEPHROSIS: ICD-10-CM

## 2022-09-27 DIAGNOSIS — Z87.891 PERSONAL HISTORY OF NICOTINE DEPENDENCE: ICD-10-CM

## 2022-09-27 DIAGNOSIS — D64.9 ANEMIA, UNSPECIFIED: ICD-10-CM

## 2022-09-27 DIAGNOSIS — Z23 ENCOUNTER FOR IMMUNIZATION: ICD-10-CM

## 2022-09-27 DIAGNOSIS — S62.367A NONDISPLACED FRACTURE OF NECK OF FIFTH METACARPAL BONE, LEFT HAND, INITIAL ENCOUNTER FOR CLOSED FRACTURE: ICD-10-CM

## 2022-10-10 ENCOUNTER — LABORATORY RESULT (OUTPATIENT)
Age: 77
End: 2022-10-10

## 2022-10-10 ENCOUNTER — APPOINTMENT (OUTPATIENT)
Dept: GASTROENTEROLOGY | Facility: CLINIC | Age: 77
End: 2022-10-10

## 2022-10-10 VITALS
OXYGEN SATURATION: 96 % | TEMPERATURE: 97.8 F | WEIGHT: 190 LBS | RESPIRATION RATE: 15 BRPM | DIASTOLIC BLOOD PRESSURE: 65 MMHG | SYSTOLIC BLOOD PRESSURE: 120 MMHG | HEART RATE: 65 BPM

## 2022-10-10 DIAGNOSIS — K27.5 CHRONIC OR UNSPECIFIED PEPTIC ULCER, SITE UNSPECIFIED, WITH PERFORATION: ICD-10-CM

## 2022-10-10 DIAGNOSIS — Z00.00 ENCOUNTER FOR GENERAL ADULT MEDICAL EXAMINATION W/OUT ABNORMAL FINDINGS: ICD-10-CM

## 2022-10-10 PROCEDURE — 99204 OFFICE O/P NEW MOD 45 MIN: CPT

## 2022-10-10 NOTE — HISTORY OF PRESENT ILLNESS
[FreeTextEntry1] : MARY LOUIS is a 77 year M here for iron deficiency anemia. He has a history of hernia repair and subtotal gastrectomy (due to perforated gastric ulcer 2/2 H pylori, 2018 at [place]), was tx for H pylori. \par \par GI History: Recent admission after a fall. Hb was 5.8, needed 2-3 u pRBC. Found to have significant iron deficiency as well. \par \par Current symptoms: Occasional reflux but otherwise doing well. No blood in stool. No weight loss. \par BMs once every 2 days w/o straining. \par \par He has EGD/Colon scheduled w/ Zlatanic later this week. \par \par GI ROS negative for weight loss, fevers/chills, dysphagia, reflux, abdominal pain, bloating, nausea, vomiting, early satiety, diarrhea, constipation, melena, hematochezia. Patient denies NSAID use. Patient denies known family history of GI cancers.\par \par Current Meds: taking PO iron\par All: NKDA\par FHx: No GI cancers or IBD\par SHx: Former rare smoker, rare EtOH\par \par Relevant Exam:\par Well appearing, large hernia\par \par Labs:\par CBC reviewed, Hb 5 -> 7\par \par Imaging: from hospitalization 201 reviewed, showing perforated ulcer and post-op UGIS showing adequate distension of stomach remnant w/o leak.

## 2022-10-10 NOTE — ASSESSMENT
[FreeTextEntry1] : Impression:\par #Iron deficiency anemia w/o overt bleeding\par #Perforated ulcer s/p resection\par #H pylori s/p tx\par \par Plan:\par - Pt has EGD/Colon scheduled with Dr. Tafoya. Advised to f/u with him.\par - Re-check labs today, CBC, CMP, iron studies.\par - F/u surgery and internal medicine as previously scheduled.\par \par F/u as needed.

## 2022-10-20 ENCOUNTER — NON-APPOINTMENT (OUTPATIENT)
Age: 77
End: 2022-10-20

## 2022-10-20 ENCOUNTER — LABORATORY RESULT (OUTPATIENT)
Age: 77
End: 2022-10-20

## 2022-10-20 ENCOUNTER — APPOINTMENT (OUTPATIENT)
Age: 77
End: 2022-10-20

## 2022-10-20 VITALS
OXYGEN SATURATION: 100 % | TEMPERATURE: 97 F | DIASTOLIC BLOOD PRESSURE: 73 MMHG | SYSTOLIC BLOOD PRESSURE: 128 MMHG | RESPIRATION RATE: 16 BRPM | HEIGHT: 72 IN | HEART RATE: 73 BPM | WEIGHT: 148.9 LBS | BODY MASS INDEX: 20.17 KG/M2

## 2022-10-20 DIAGNOSIS — I26.99 OTHER PULMONARY EMBOLISM W/OUT ACUTE COR PULMONALE: ICD-10-CM

## 2022-10-20 PROCEDURE — 93000 ELECTROCARDIOGRAM COMPLETE: CPT

## 2022-10-20 PROCEDURE — 36415 COLL VENOUS BLD VENIPUNCTURE: CPT

## 2022-10-20 PROCEDURE — 99214 OFFICE O/P EST MOD 30 MIN: CPT | Mod: 25,GC

## 2022-10-20 NOTE — RESULTS/DATA
[] : results reviewed [de-identified] : no concerns; script written previously for nuclear stress test to be performed in November  [de-identified] : d

## 2022-10-20 NOTE — REVIEW OF SYSTEMS
[Easy Bruising] : easy bruising [Negative] : Psychiatric [Fever] : no fever [Chills] : no chills [Easy Bleeding] : no easy bleeding [Swollen Glands] : no swollen glands

## 2022-10-20 NOTE — PLAN
[FreeTextEntry1] : Iron Deficiency Anemia\par Patient w recent Hgb 5.8 and hx of perforated ulcer s/p subtotal gastrectomy EGD negative for signs of bleeding\par - f/up colonoscopy\par -f/up CBC\par - make sure patient is optimized before elective hernia surgery\par \par Hernia\par Patient w recurrent R inguinal/scrotal hernia. Patients visit today was for pre op clearance. \par EKG wnl. METs score >4; patient is at a higher risk for surgery given recent Hgb requiring 2 units pRBC and provoked PE\par f/up colonoscopy and CBC to further understand source of iron deficiency/assess for sights of bleeding\par f/up nuclear stress test results\par will see patient after colonoscopy, Chest xray, nuclear stress test and blood work to further optimize before hernia repair

## 2022-10-20 NOTE — HISTORY OF PRESENT ILLNESS
[(Patient denies any chest pain, claudication, dyspnea on exertion, orthopnea, palpitations or syncope)] : Patient denies any chest pain, claudication, dyspnea on exertion, orthopnea, palpitations or syncope [____ METs%] : [unfilled] METs% [Good (7-10 METs)] : Good (7-10 METs) [Aortic Stenosis] : no aortic stenosis [Atrial Fibrillation] : no atrial fibrillation [Coronary Artery Disease] : no coronary artery disease [Recent Myocardial Infarction] : no recent myocardial infarction [Implantable Device/Pacemaker] : no implantable device/pacemaker [Asthma] : no asthma [COPD] : no COPD [Sleep Apnea] : no sleep apnea [Smoker] : not a smoker [FreeTextEntry4] : 78 yo M w PMH perforated ulcer w H.pylori (2018, s/p subtotal gastrectomy), PE (provoked from surgery, 2018), R inguinal/scrotal hernia containing small bowel (s/p repair in 2018), and iron def anemia (September hospital admission for Hgb 5.8> s/p 2 units pRBC) here for pre op clearance for recurrent R inguinal hernia surgery. In terms of recent iron deficiency anemia, patient is currently undergoing workup. Endoscopy from this week was negative for acute pathology. Patient has a colonoscopy scheduled for 10/31. Patient denies any hematuria, melena, or BRBPR.  [FreeTextEntry7] : EKG: from 10/20 rate: 61 bpm, rhythm: regular sinus rhythm; axis: normal, no prolonged VA, QRS, QT, no hypertrophy or infarction; 1 lead T wave inversion in V3 [FreeTextEntry8] : patient able to walk 10+ blocks, able to climb flight of stairs

## 2022-10-20 NOTE — PHYSICAL EXAM
[No Acute Distress] : no acute distress [PERRL] : pupils equal round and reactive to light [Well-Appearing] : well-appearing [EOMI] : extraocular movements intact [Normal Outer Ear/Nose] : the outer ears and nose were normal in appearance [Normal Oropharynx] : the oropharynx was normal [No JVD] : no jugular venous distention [No Lymphadenopathy] : no lymphadenopathy [Supple] : supple [Thyroid Normal, No Nodules] : the thyroid was normal and there were no nodules present [No Respiratory Distress] : no respiratory distress  [No Accessory Muscle Use] : no accessory muscle use [Clear to Auscultation] : lungs were clear to auscultation bilaterally [Normal Rate] : normal rate  [Regular Rhythm] : with a regular rhythm [Normal S1, S2] : normal S1 and S2 [No Carotid Bruits] : no carotid bruits [No Abdominal Bruit] : a ~M bruit was not heard ~T in the abdomen [No Varicosities] : no varicosities [Pedal Pulses Present] : the pedal pulses are present [No Palpable Aorta] : no palpable aorta [Soft] : abdomen soft [Non Tender] : non-tender [Normal Bowel Sounds] : normal bowel sounds [Normal Posterior Cervical Nodes] : no posterior cervical lymphadenopathy [Normal Anterior Cervical Nodes] : no anterior cervical lymphadenopathy [No CVA Tenderness] : no CVA  tenderness [No Spinal Tenderness] : no spinal tenderness [No Joint Swelling] : no joint swelling [Grossly Normal Strength/Tone] : grossly normal strength/tone [No Rash] : no rash [Coordination Grossly Intact] : coordination grossly intact [No Focal Deficits] : no focal deficits [Normal Gait] : normal gait [Deep Tendon Reflexes (DTR)] : deep tendon reflexes were 2+ and symmetric [Speech Grossly Normal] : speech grossly normal [Normal Affect] : the affect was normal [de-identified] : +1 pitting edema of ankles b/l [de-identified] : large R sided hernia

## 2022-10-20 NOTE — ASSESSMENT
[Patient Requires Further Testing] : Patient requires further testing [Stress Test] : stress test [As per surgery] : as per surgery [FreeTextEntry4] : 78 yo M w PMH perforated ulcer w H.pylori (2018, s/p subtotal gastrectomy), PE (provoked from surgery, 2018), R inguinal/scrotal hernia containing small bowel (s/p repair in 2018), and iron def anemia (September hospital admission for Hgb 5.8> s/p 2 units pRBC) here for pre op clearance for recurrent R inguinal hernia surgery. In terms of recent iron deficiency anemia, patient is currently undergoing workup. Endoscopy from this week was negative for acute pathology. Patient has a colonoscopy scheduled for 10/31. In terms of surgery risk, METS >4, however given recent anemia, and hx of provoked PE, patient is at risk. Patient is going for colonoscopy 10/31. EKG wnl. Patient was given a script prior to PCP check up for nuclear stress test. Will f/up w results. Patient underwent CBC today to assess Hgb. Patient was given script for chest xray. Patient will come back to office after nuclear stress test and rest of blood work will be performed to make sure patient is optimized for surgery.  [FreeTextEntry2] : given hx of provoked PE, caution for future surgeries

## 2022-10-21 LAB
ALBUMIN SERPL ELPH-MCNC: 3.4 G/DL
ALP BLD-CCNC: 117 U/L
ALT SERPL-CCNC: 10 U/L
ANION GAP SERPL CALC-SCNC: 8 MMOL/L
AST SERPL-CCNC: 12 U/L
BASOPHILS # BLD AUTO: 0.03 K/UL
BASOPHILS NFR BLD AUTO: 0.9 %
BILIRUB SERPL-MCNC: 0.3 MG/DL
BUN SERPL-MCNC: 14 MG/DL
CALCIUM SERPL-MCNC: 9 MG/DL
CHLORIDE SERPL-SCNC: 104 MMOL/L
CHOLEST SERPL-MCNC: 126 MG/DL
CO2 SERPL-SCNC: 26 MMOL/L
CREAT SERPL-MCNC: 0.85 MG/DL
EGFR: 90 ML/MIN/1.73M2
EOSINOPHIL # BLD AUTO: 0.2 K/UL
EOSINOPHIL NFR BLD AUTO: 6.2 %
ESTIMATED AVERAGE GLUCOSE: 114 MG/DL
FERRITIN SERPL-MCNC: 40 NG/ML
GLUCOSE SERPL-MCNC: 87 MG/DL
HBA1C MFR BLD HPLC: 5.6 %
HCT VFR BLD CALC: 27.8 %
HCV AB SER QL: NONREACTIVE
HCV S/CO RATIO: 0.13 S/CO
HDLC SERPL-MCNC: 52 MG/DL
HGB BLD-MCNC: 8 G/DL
IRON SATN MFR SERPL: 6 %
IRON SERPL-MCNC: 21 UG/DL
LDLC SERPL CALC-MCNC: 62 MG/DL
LYMPHOCYTES # BLD AUTO: 0.6 K/UL
LYMPHOCYTES NFR BLD AUTO: 18.6 %
MAN DIFF?: NORMAL
MCHC RBC-ENTMCNC: 21.2 PG
MCHC RBC-ENTMCNC: 28.8 GM/DL
MCV RBC AUTO: 73.5 FL
MONOCYTES # BLD AUTO: 0.2 K/UL
MONOCYTES NFR BLD AUTO: 6.2 %
NEUTROPHILS # BLD AUTO: 2.18 K/UL
NEUTROPHILS NFR BLD AUTO: 67.2 %
NONHDLC SERPL-MCNC: 73 MG/DL
PLATELET # BLD AUTO: 296 K/UL
POTASSIUM SERPL-SCNC: 4.4 MMOL/L
PROT SERPL-MCNC: 6.8 G/DL
RBC # BLD: 3.78 M/UL
RBC # FLD: 28 %
SODIUM SERPL-SCNC: 138 MMOL/L
TIBC SERPL-MCNC: 378 UG/DL
TRIGL SERPL-MCNC: 54 MG/DL
TSH SERPL-ACNC: 5.23 UIU/ML
UIBC SERPL-MCNC: 357 UG/DL
WBC # FLD AUTO: 3.25 K/UL

## 2022-10-25 ENCOUNTER — NON-APPOINTMENT (OUTPATIENT)
Age: 77
End: 2022-10-25

## 2022-12-05 ENCOUNTER — APPOINTMENT (OUTPATIENT)
Dept: INTERNAL MEDICINE | Facility: CLINIC | Age: 77
End: 2022-12-05

## 2022-12-05 VITALS
TEMPERATURE: 98.6 F | HEART RATE: 74 BPM | SYSTOLIC BLOOD PRESSURE: 130 MMHG | RESPIRATION RATE: 14 BRPM | OXYGEN SATURATION: 96 % | DIASTOLIC BLOOD PRESSURE: 68 MMHG

## 2022-12-05 DIAGNOSIS — Z01.818 ENCOUNTER FOR OTHER PREPROCEDURAL EXAMINATION: ICD-10-CM

## 2022-12-05 DIAGNOSIS — Z23 ENCOUNTER FOR IMMUNIZATION: ICD-10-CM

## 2022-12-05 DIAGNOSIS — K46.9 UNSPECIFIED ABDOMINAL HERNIA W/OUT OBSTRUCTION OR GANGRENE: ICD-10-CM

## 2022-12-05 PROCEDURE — 90662 IIV NO PRSV INCREASED AG IM: CPT

## 2022-12-05 PROCEDURE — G0008: CPT

## 2022-12-05 PROCEDURE — 99213 OFFICE O/P EST LOW 20 MIN: CPT | Mod: GC,25

## 2022-12-05 NOTE — ASSESSMENT
[FreeTextEntry1] : 77M with pmhx of perforated ulcer s/p subtotal gastrectomy in 2018, PE (provoked by surgery), R scrotal hernia, WALLACE (hx of requiring transfusions in sept 2022) who presents for f/u for pre op for R inguinal hernia surgery. \par \par #WALLACE\par Hx of WALLACE, admitted to [facility] sept 2022 for hgb 5.8, requiring 2 transfusions. hgb in october was 8.0, and per patient that sees outpatient GI, on repeat hgb was 7.5 approximately 1.5 weeks ago. Pt reports he has been intermittently compliant with iron pills\par -EGD negative; colonoscopy showed internal and external hemorrhoids with 30mm polyp that was removed\par -advised patient to continue iron supplementation, refill provided\par -given referral for heme/onc for further work up/management of WALLACE\par \par #Pre op\par pending R inguinal hernia repair\par -nuclear stress + and patient pending cardiac cath on 12/19\par -advised pt to RTC after cath and heme onc f/u to complete pre op paperwork/repeat CBC\par \par #colon polyp\par colonoscopy completed in october which showed one 30mm polyp, which was removed\par -per patient, he was told by GI that the polyp is "positive" but is unsure what it is positive for\par -will f/u with GI for results\par \par #HCM\par flu shot administered today

## 2022-12-05 NOTE — PHYSICAL EXAM
[No Acute Distress] : no acute distress [Clear to Auscultation] : lungs were clear to auscultation bilaterally [Normal Rate] : normal rate  [Regular Rhythm] : with a regular rhythm [Normal S1, S2] : normal S1 and S2 [No Focal Deficits] : no focal deficits [Normal Affect] : the affect was normal [de-identified] : +Large inguinal hernia

## 2022-12-05 NOTE — END OF VISIT
[] : Resident [FreeTextEntry3] : Here for follow up, was recently here for preop for R inguinal surgery, however required further workup for anemia (WALLACE)\par Had GI eval- EGD was negative, colonoscopy with 30mm polyp that was removed\par Was given NST script, was abnormal however was  rec cardiac cath on 12/19\par Preop- given need for cardiac cath, not optimized for procedure at this time. Needs to complete cardiac cath first\par Follow up s/p cardiac cath for preop eval\par WALLACE- had blood work from outside provider and notes that his hgb has been 7.5, just recently compliant on iron pills, rec heme eval preop to optimize anemia [Time Spent: ___ minutes] : I have spent [unfilled] minutes of time on the encounter.

## 2022-12-05 NOTE — HISTORY OF PRESENT ILLNESS
[de-identified] : 77M with pmhx of perforated ulcer s/p subtotal gastrectomy in 2018, PE (provoked by surgery), R scrotal hernia, WALLACE (hx of requiring transfusions in sept 2022) who presents for f/u for pre op for R inguinal hernia surgery. Pt followed with GI for WALLACE and underwent EGD, which was negative and colonscopy which showed internal and external non bleeding hemorrhoids, as well as a one 30mm polyp, which was removed and per the patient was "positive" but unable to clarify positive for what. Pt also followed up for his nuclear stress test, which was concerning for blockages and is pending a cardiac cath on 12/19. Otherwise patient feels well, denies pain to the hernia. Has been intermittently compliant with PO iron. Denies melena, hematochezia, BRBPR, hematuria, chest pain, dizziness, shortness of breath.

## 2022-12-05 NOTE — REVIEW OF SYSTEMS
[Fever] : no fever [Chills] : no chills [Recent Change In Weight] : ~T no recent weight change [Chest Pain] : no chest pain [Palpitations] : no palpitations [Lower Ext Edema] : no lower extremity edema [Orthopena] : no orthopnea [Shortness Of Breath] : no shortness of breath [Dyspnea on Exertion] : not dyspnea on exertion [Abdominal Pain] : no abdominal pain [Constipation] : no constipation [Diarrhea] : no diarrhea [Vomiting] : no vomiting [Melena] : no melena [Dysuria] : no dysuria [Hematuria] : no hematuria [Headache] : no headache [Dizziness] : no dizziness [Easy Bleeding] : no easy bleeding [Easy Bruising] : no easy bruising

## 2022-12-16 ENCOUNTER — LABORATORY RESULT (OUTPATIENT)
Age: 77
End: 2022-12-16

## 2022-12-16 ENCOUNTER — APPOINTMENT (OUTPATIENT)
Dept: HEMATOLOGY ONCOLOGY | Facility: CLINIC | Age: 77
End: 2022-12-16

## 2022-12-16 VITALS
DIASTOLIC BLOOD PRESSURE: 66 MMHG | TEMPERATURE: 97.9 F | HEIGHT: 72 IN | BODY MASS INDEX: 26.28 KG/M2 | RESPIRATION RATE: 18 BRPM | SYSTOLIC BLOOD PRESSURE: 122 MMHG | OXYGEN SATURATION: 98 % | WEIGHT: 194 LBS | HEART RATE: 96 BPM

## 2022-12-16 DIAGNOSIS — R53.83 OTHER FATIGUE: ICD-10-CM

## 2022-12-16 PROCEDURE — 99204 OFFICE O/P NEW MOD 45 MIN: CPT | Mod: 25

## 2022-12-16 PROCEDURE — 36415 COLL VENOUS BLD VENIPUNCTURE: CPT

## 2022-12-16 NOTE — REVIEW OF SYSTEMS
[Fever] : no fever [Chills] : no chills [Fatigue] : no fatigue [Recent Change In Weight] : ~T no recent weight change [Chest Pain] : no chest pain [Palpitations] : no palpitations [Lower Ext Edema] : no lower extremity edema [Shortness Of Breath] : no shortness of breath [Cough] : no cough [SOB on Exertion] : no shortness of breath during exertion [Abdominal Pain] : no abdominal pain [Vomiting] : no vomiting [Constipation] : no constipation [Diarrhea] : no diarrhea [Dizziness] : no dizziness [Difficulty Walking] : no difficulty walking [Negative] : Heme/Lymph [FreeTextEntry7] : no melena, no hematochezia  [FreeTextEntry8] : no hematuria

## 2022-12-16 NOTE — ASSESSMENT
[FreeTextEntry1] : 76 yo M here for initial evaluation of iron deficiency anemia. \par \par WALLACE \par - labs in the hospital with Hb 5.8 and iron studies with severe deficiency (Tsat 6%, ferritin 40). s/p 2-3 units PRBC and IV iron. \par - CBC, Iron studies and ferritin today. \par - He had a colonoscopy on 10/31/22 which showed a sigmoid colon polyp s/p polypectomy, path per patient c/w cancer. Will obtain results from Dr. Tabor's office. \par - If there is evidence of iron deficiency on today's labs, will recommend giving IV iron x 2 doses given history of gastrectomy leading to possible malabsorption. \par - Advised to continue oral iron supplementation daily. \par \par RTC in 6 weeks.

## 2022-12-16 NOTE — HISTORY OF PRESENT ILLNESS
[de-identified] : Mr. Villanueva is a 77 year old male here for initial evaluation for iron deficiency anemia. \par \par He had a recent admission to [facility] after a mechanical fall. Labs significant for severe anemia with Hb of 5.8 s/p 2-3 units of PRBC. Iron studies c/w with severe iron deficiency with Tsat 6% and ferritin of 40. He was treated with IV iron in the hospital. Discharged on oral iron which he takes once a day and PPI. He has a history of hernia repair and subtotal gastrectomy (due to perforated gastric ulcer 2/2 H pylori, 2018 at [place]), was treated for H pylori. He underwent a colonoscopy on 10/31/22 with Dr. Tafoya, had a polyp removed which he reports was positive for cancer, he does not have the pathology report available for review. He was seeing Dr. Jett (Surgeon) for his upcoming inguinal hernia repair, which his currently on hold due to severe WALLACE. He was told by Dr. Jett that he would require a colon resection for his cancer. \par \par Colonoscopy 10/31/22: \par - Patent end-to end ileocolonic anastomosis, characterized by healthy appearing mucosa. \par - Diverticulosis in the sigmoid colon \par - One 30 mm polyp in the sigmoid colon s/p polypectomy. \par - Non-bleeding external and internal hemorrhoids. \par \par Currently denies any chest pain, sob, palpitations, dizziness, abdominal pain, n/v/d/c, melena, hematochezia, hematuria, weight loss, lack of appetite. Never had a colonoscopy in the past. \par \par PMH: perforated ulcer s/p gastrectomy (2019), H.pylori s/p treatment, PE (provoked in rehab post surgery). Right inguinal hernia. \par PSH: Hernia repair, gastrectomy \par FH: No family history of cancer \par SH: Former smoker, quit 20 year ago. Used to smoke 1 pack per week x 15 years. Denies any alcohol or drug use. Lives alone.

## 2022-12-16 NOTE — PHYSICAL EXAM
[Fully active, able to carry on all pre-disease performance without restriction] : Status 0 - Fully active, able to carry on all pre-disease performance without restriction [Normal] : affect appropriate [de-identified] : large right sided inguinal hernia

## 2022-12-16 NOTE — END OF VISIT
[] : Fellow [FreeTextEntry3] : \par I saw and evaluated the patient with the Hematology/Oncology fellow, Dr Rose.  BRiefly, the patient is a 77 year old man who presents to establish care for anemia.  He had severe anemia during a 9/2022 hospitalization that was attributed to iron deficiency.  We do not have any recent labs for follow up.  THe patient has a number of medical issues, including possibly colon cancer - a 3 cm polyp was removed from the colon in 10/2022 and he was told that this was "positive" and that he may need surgery, but pathology results are not available for review.\par \par Plan:\par - CBC, iron studies today \par - will check B12 level given hx gastrectomy\par - optimize blood counts in anticipation of possible surgery (for either colon or inguinal hernia)\par - will communicate w/ PCP, GI Dr Tafoya, and surgeon Dr Jett when optimized from a hematologic standpoint\par - pt seems to have a dismissive attitude regarding his medical problems - made an effort at this visit to emphasize the need to follow through on testing and treatment especially if there are concerns for malignancy.

## 2022-12-22 LAB
BASOPHILS # BLD AUTO: 0.04 K/UL
BASOPHILS NFR BLD AUTO: 0.9 %
EOSINOPHIL # BLD AUTO: 0.37 K/UL
EOSINOPHIL NFR BLD AUTO: 8.9 %
FERRITIN SERPL-MCNC: 29 NG/ML
HCT VFR BLD CALC: 23.8 %
HGB BLD-MCNC: 7.2 G/DL
IRON SATN MFR SERPL: 3 %
IRON SERPL-MCNC: 12 UG/DL
LYMPHOCYTES # BLD AUTO: 0.6 K/UL
LYMPHOCYTES NFR BLD AUTO: 14.3 %
MAN DIFF?: NORMAL
MCHC RBC-ENTMCNC: 22 PG
MCHC RBC-ENTMCNC: 30.3 GM/DL
MCV RBC AUTO: 72.8 FL
MONOCYTES # BLD AUTO: 0.23 K/UL
MONOCYTES NFR BLD AUTO: 5.4 %
NEUTROPHILS # BLD AUTO: 2.91 K/UL
NEUTROPHILS NFR BLD AUTO: 69.6 %
PLATELET # BLD AUTO: 280 K/UL
RBC # BLD: 3.27 M/UL
RBC # FLD: 19.9 %
TIBC SERPL-MCNC: 418 UG/DL
UIBC SERPL-MCNC: 406 UG/DL
VIT B12 SERPL-MCNC: <150 PG/ML
WBC # FLD AUTO: 4.18 K/UL

## 2022-12-28 ENCOUNTER — OUTPATIENT (OUTPATIENT)
Dept: OUTPATIENT SERVICES | Facility: HOSPITAL | Age: 77
LOS: 1 days | End: 2022-12-28
Payer: MEDICARE

## 2022-12-28 ENCOUNTER — APPOINTMENT (OUTPATIENT)
Dept: INFUSION THERAPY | Facility: CLINIC | Age: 77
End: 2022-12-28

## 2022-12-28 VITALS
DIASTOLIC BLOOD PRESSURE: 73 MMHG | SYSTOLIC BLOOD PRESSURE: 131 MMHG | TEMPERATURE: 97 F | RESPIRATION RATE: 18 BRPM | HEART RATE: 94 BPM | OXYGEN SATURATION: 96 %

## 2022-12-28 DIAGNOSIS — D50.9 IRON DEFICIENCY ANEMIA, UNSPECIFIED: ICD-10-CM

## 2022-12-28 DIAGNOSIS — Z98.890 OTHER SPECIFIED POSTPROCEDURAL STATES: Chronic | ICD-10-CM

## 2022-12-28 RX ORDER — IRON SUCROSE 20 MG/ML
300 INJECTION, SOLUTION INTRAVENOUS ONCE
Refills: 0 | Status: COMPLETED | OUTPATIENT
Start: 2022-12-28 | End: 2022-12-28

## 2022-12-28 RX ORDER — PREGABALIN 225 MG/1
1000 CAPSULE ORAL ONCE
Refills: 0 | Status: COMPLETED | OUTPATIENT
Start: 2022-12-28 | End: 2022-12-28

## 2022-12-28 RX ADMIN — IRON SUCROSE 300 MILLIGRAM(S): 20 INJECTION, SOLUTION INTRAVENOUS at 17:30

## 2022-12-28 RX ADMIN — PREGABALIN 1000 MICROGRAM(S): 225 CAPSULE ORAL at 16:07

## 2022-12-28 RX ADMIN — IRON SUCROSE 176.67 MILLIGRAM(S): 20 INJECTION, SOLUTION INTRAVENOUS at 16:04

## 2022-12-29 PROCEDURE — 96372 THER/PROPH/DIAG INJ SC/IM: CPT

## 2022-12-29 PROCEDURE — 86901 BLOOD TYPING SEROLOGIC RH(D): CPT

## 2022-12-29 PROCEDURE — 86850 RBC ANTIBODY SCREEN: CPT

## 2022-12-29 PROCEDURE — 96365 THER/PROPH/DIAG IV INF INIT: CPT

## 2022-12-29 PROCEDURE — 86900 BLOOD TYPING SEROLOGIC ABO: CPT

## 2023-01-04 ENCOUNTER — OUTPATIENT (OUTPATIENT)
Dept: OUTPATIENT SERVICES | Facility: HOSPITAL | Age: 78
LOS: 1 days | End: 2023-01-04
Payer: MEDICARE

## 2023-01-04 ENCOUNTER — APPOINTMENT (OUTPATIENT)
Dept: INFUSION THERAPY | Facility: CLINIC | Age: 78
End: 2023-01-04

## 2023-01-04 VITALS
SYSTOLIC BLOOD PRESSURE: 138 MMHG | DIASTOLIC BLOOD PRESSURE: 71 MMHG | TEMPERATURE: 97 F | HEART RATE: 65 BPM | OXYGEN SATURATION: 100 % | RESPIRATION RATE: 18 BRPM

## 2023-01-04 DIAGNOSIS — Z98.890 OTHER SPECIFIED POSTPROCEDURAL STATES: Chronic | ICD-10-CM

## 2023-01-04 DIAGNOSIS — D50.9 IRON DEFICIENCY ANEMIA, UNSPECIFIED: ICD-10-CM

## 2023-01-04 PROCEDURE — 96365 THER/PROPH/DIAG IV INF INIT: CPT

## 2023-01-04 PROCEDURE — 96372 THER/PROPH/DIAG INJ SC/IM: CPT

## 2023-01-04 PROCEDURE — 96366 THER/PROPH/DIAG IV INF ADDON: CPT

## 2023-01-04 RX ORDER — PREGABALIN 225 MG/1
1000 CAPSULE ORAL ONCE
Refills: 0 | Status: COMPLETED | OUTPATIENT
Start: 2023-01-04 | End: 2023-01-04

## 2023-01-04 RX ORDER — IRON SUCROSE 20 MG/ML
300 INJECTION, SOLUTION INTRAVENOUS ONCE
Refills: 0 | Status: COMPLETED | OUTPATIENT
Start: 2023-01-04 | End: 2023-01-04

## 2023-01-04 RX ADMIN — PREGABALIN 1000 MICROGRAM(S): 225 CAPSULE ORAL at 15:41

## 2023-01-04 RX ADMIN — IRON SUCROSE 176.67 MILLIGRAM(S): 20 INJECTION, SOLUTION INTRAVENOUS at 15:41

## 2023-01-04 RX ADMIN — IRON SUCROSE 300 MILLIGRAM(S): 20 INJECTION, SOLUTION INTRAVENOUS at 17:15

## 2023-01-11 ENCOUNTER — OUTPATIENT (OUTPATIENT)
Dept: OUTPATIENT SERVICES | Facility: HOSPITAL | Age: 78
LOS: 1 days | End: 2023-01-11
Payer: MEDICARE

## 2023-01-11 ENCOUNTER — APPOINTMENT (OUTPATIENT)
Dept: INFUSION THERAPY | Facility: CLINIC | Age: 78
End: 2023-01-11

## 2023-01-11 VITALS
HEART RATE: 61 BPM | OXYGEN SATURATION: 99 % | DIASTOLIC BLOOD PRESSURE: 71 MMHG | TEMPERATURE: 98 F | RESPIRATION RATE: 18 BRPM | SYSTOLIC BLOOD PRESSURE: 127 MMHG

## 2023-01-11 DIAGNOSIS — Z98.890 OTHER SPECIFIED POSTPROCEDURAL STATES: Chronic | ICD-10-CM

## 2023-01-11 DIAGNOSIS — D50.9 IRON DEFICIENCY ANEMIA, UNSPECIFIED: ICD-10-CM

## 2023-01-11 RX ORDER — PREGABALIN 225 MG/1
1000 CAPSULE ORAL ONCE
Refills: 0 | Status: COMPLETED | OUTPATIENT
Start: 2023-01-11 | End: 2023-01-11

## 2023-01-11 RX ORDER — IRON SUCROSE 20 MG/ML
300 INJECTION, SOLUTION INTRAVENOUS ONCE
Refills: 0 | Status: COMPLETED | OUTPATIENT
Start: 2023-01-11 | End: 2023-01-11

## 2023-01-11 RX ADMIN — PREGABALIN 1000 MICROGRAM(S): 225 CAPSULE ORAL at 15:40

## 2023-01-11 RX ADMIN — IRON SUCROSE 176.67 MILLIGRAM(S): 20 INJECTION, SOLUTION INTRAVENOUS at 15:45

## 2023-01-11 RX ADMIN — IRON SUCROSE 300 MILLIGRAM(S): 20 INJECTION, SOLUTION INTRAVENOUS at 17:15

## 2023-01-26 ENCOUNTER — NON-APPOINTMENT (OUTPATIENT)
Age: 78
End: 2023-01-26

## 2023-01-26 NOTE — DISCUSSION/SUMMARY
[FreeTextEntry1] : spoke to pt re: lab results.  severe anemia persists with iron deficiency and B12 deficiency.  Recommend IV iron weekly x 4 doses + B12 IM injection weekly x 4 doses.  He agrees; will ask team to schedule.  follow up as scheduled.

## 2023-01-27 ENCOUNTER — APPOINTMENT (OUTPATIENT)
Dept: HEMATOLOGY ONCOLOGY | Facility: CLINIC | Age: 78
End: 2023-01-27

## 2023-02-02 ENCOUNTER — APPOINTMENT (OUTPATIENT)
Dept: INFUSION THERAPY | Facility: CLINIC | Age: 78
End: 2023-02-02

## 2023-02-02 ENCOUNTER — APPOINTMENT (OUTPATIENT)
Dept: HEMATOLOGY ONCOLOGY | Facility: CLINIC | Age: 78
End: 2023-02-02
Payer: MEDICARE

## 2023-02-02 ENCOUNTER — LABORATORY RESULT (OUTPATIENT)
Age: 78
End: 2023-02-02

## 2023-02-02 ENCOUNTER — OUTPATIENT (OUTPATIENT)
Dept: OUTPATIENT SERVICES | Facility: HOSPITAL | Age: 78
LOS: 1 days | End: 2023-02-02
Payer: MEDICARE

## 2023-02-02 ENCOUNTER — NON-APPOINTMENT (OUTPATIENT)
Age: 78
End: 2023-02-02

## 2023-02-02 VITALS
SYSTOLIC BLOOD PRESSURE: 158 MMHG | RESPIRATION RATE: 18 BRPM | DIASTOLIC BLOOD PRESSURE: 70 MMHG | HEART RATE: 63 BPM | TEMPERATURE: 98 F | OXYGEN SATURATION: 100 %

## 2023-02-02 VITALS
SYSTOLIC BLOOD PRESSURE: 149 MMHG | WEIGHT: 194.5 LBS | DIASTOLIC BLOOD PRESSURE: 66 MMHG | HEIGHT: 72 IN | RESPIRATION RATE: 16 BRPM | TEMPERATURE: 96.5 F | OXYGEN SATURATION: 95 % | HEART RATE: 68 BPM | BODY MASS INDEX: 26.34 KG/M2

## 2023-02-02 DIAGNOSIS — Z98.890 OTHER SPECIFIED POSTPROCEDURAL STATES: Chronic | ICD-10-CM

## 2023-02-02 DIAGNOSIS — D50.9 IRON DEFICIENCY ANEMIA, UNSPECIFIED: ICD-10-CM

## 2023-02-02 PROCEDURE — 96372 THER/PROPH/DIAG INJ SC/IM: CPT

## 2023-02-02 PROCEDURE — 96366 THER/PROPH/DIAG IV INF ADDON: CPT

## 2023-02-02 PROCEDURE — 96365 THER/PROPH/DIAG IV INF INIT: CPT

## 2023-02-02 PROCEDURE — 36415 COLL VENOUS BLD VENIPUNCTURE: CPT

## 2023-02-02 PROCEDURE — 99215 OFFICE O/P EST HI 40 MIN: CPT | Mod: 25

## 2023-02-02 RX ORDER — PREGABALIN 225 MG/1
1000 CAPSULE ORAL ONCE
Refills: 0 | Status: COMPLETED | OUTPATIENT
Start: 2023-02-02 | End: 2023-02-02

## 2023-02-02 RX ORDER — IRON SUCROSE 20 MG/ML
300 INJECTION, SOLUTION INTRAVENOUS ONCE
Refills: 0 | Status: COMPLETED | OUTPATIENT
Start: 2023-02-02 | End: 2023-02-02

## 2023-02-02 RX ADMIN — PREGABALIN 1000 MICROGRAM(S): 225 CAPSULE ORAL at 15:33

## 2023-02-02 RX ADMIN — IRON SUCROSE 300 MILLIGRAM(S): 20 INJECTION, SOLUTION INTRAVENOUS at 17:05

## 2023-02-02 RX ADMIN — IRON SUCROSE 176.67 MILLIGRAM(S): 20 INJECTION, SOLUTION INTRAVENOUS at 15:35

## 2023-02-02 NOTE — REVIEW OF SYSTEMS
[Diarrhea] : diarrhea [Negative] : Cardiovascular [Fever] : no fever [Chills] : no chills [Fatigue] : no fatigue [Recent Change In Weight] : ~T no recent weight change [Chest Pain] : no chest pain [Palpitations] : no palpitations [Lower Ext Edema] : no lower extremity edema [Shortness Of Breath] : no shortness of breath [Cough] : no cough [SOB on Exertion] : no shortness of breath during exertion [Abdominal Pain] : no abdominal pain [Vomiting] : no vomiting [Constipation] : no constipation [Dizziness] : no dizziness [Difficulty Walking] : no difficulty walking [FreeTextEntry7] : no melena, no hematochezia, loose stools intermittently every few days, large inguinal hernia [FreeTextEntry8] : no hematuria

## 2023-02-02 NOTE — END OF VISIT
[] : Fellow Multiple myeloma [FreeTextEntry3] : \par I saw and evaluated the patient with the Hematology/Oncology fellow, Dr Rose.  BRiefly, the patient is a 77 year old man who presents to establish care for anemia.  He had severe anemia during a 9/2022 hospitalization that was attributed to iron deficiency.  We do not have any recent labs for follow up.  THe patient has a number of medical issues, including possibly colon cancer - a 3 cm polyp was removed from the colon in 10/2022 and he was told that this was "positive" and that he may need surgery, but pathology results are not available for review.\par \par Plan:\par - CBC, iron studies today \par - will check B12 level given hx gastrectomy\par - optimize blood counts in anticipation of possible surgery (for either colon or inguinal hernia)\par - will communicate w/ PCP, GI Dr Tafoya, and surgeon Dr Jett when optimized from a hematologic standpoint\par - pt seems to have a dismissive attitude regarding his medical problems - made an effort at this visit to emphasize the need to follow through on testing and treatment especially if there are concerns for malignancy.

## 2023-02-02 NOTE — PHYSICAL EXAM
[Normal] : affect appropriate [Restricted in physically strenuous activity but ambulatory and able to carry out work of a light or sedentary nature] : Status 1- Restricted in physically strenuous activity but ambulatory and able to carry out work of a light or sedentary nature, e.g., light house work, office work [de-identified] : giant right sided inguinal hernia

## 2023-02-02 NOTE — HISTORY OF PRESENT ILLNESS
[de-identified] : Mr. Villanueva is a 77 year old male here for follow up of combined B12 and iron deficiency anemia. \par \par He had a 9/2022 admission to [facility] after a mechanical fall. Labs significant for severe anemia with Hb of 5.8 s/p 2-3 units of PRBC. Iron studies c/w with severe iron deficiency with Tsat 6% and ferritin of 40. He was treated with IV iron in the hospital. Discharged on oral iron which he takes once a day and PPI. He has a history of hernia repair and subtotal gastrectomy (due to perforated gastric ulcer 2/2 H pylori, 2018 at [place]), was treated for H pylori. He underwent a colonoscopy on 10/31/22 with Dr. Tafoya, had a polyp removed which he reports was positive for cancer.  At the initial visit we did not have records, but these later became available (listed below). He was seeing Dr. Jett (Surgeon) for his upcoming inguinal hernia repair, which his currently on hold due to severe WALLACE. He was told by Dr. Jett that he would require a colon resection for his cancer. \par \par CT 9/2022 at [facility] - giant inguinal hernia - no findings for malignancy\par \par Colonoscopy 10/31/22: \par - Patent end-to end ileocolonic anastomosis, characterized by healthy appearing mucosa. \par - Diverticulosis in the sigmoid colon \par - One 30 mm polyp in the sigmoid colon s/p polypectomy. \par - Non-bleeding external and internal hemorrhoids. \par \par Pathology 10/31/22:\par sigmoid colon polyp s/p polypectomy,  at least intramucosal adenocarcinoma and foci suspicious for submucosal invasion\par \par Received IV Iron (venofer) 3/4 300 mg doses - starting 12/2022\par B12 deficiency - repleted with weekly B12 injections - starting 12/2022\par \par \par PMH: perforated ulcer s/p gastrectomy (2019), H.pylori s/p treatment, PE (provoked in rehab post surgery). Right inguinal hernia. \par PSH: Hernia repair, gastrectomy \par FH: No family history of cancer \par SH: Former smoker, quit 20 year ago. Used to smoke 1 pack per week x 15 years. Denies any alcohol or drug use. Lives alone in [facility] [de-identified] : Has a lot of loose stool, intermittently comes and goes. Endorses eating a lot ice cream but denies lactose intolerance. Anxious by the amount of medical work up he needs to have done.

## 2023-02-02 NOTE — HISTORY OF PRESENT ILLNESS
[de-identified] : Mr. Villanueva is a 77 year old male here for initial evaluation for iron deficiency anemia. \par \par He had a recent admission to [facility] after a mechanical fall. Labs significant for severe anemia with Hb of 5.8 s/p 2-3 units of PRBC. Iron studies c/w with severe iron deficiency with Tsat 6% and ferritin of 40. He was treated with IV iron in the hospital. Discharged on oral iron which he takes once a day and PPI. He has a history of hernia repair and subtotal gastrectomy (due to perforated gastric ulcer 2/2 H pylori, 2018 at [place]), was treated for H pylori. He underwent a colonoscopy on 10/31/22 with Dr. Tafoya, had a polyp removed which he reports was positive for cancer, he does not have the pathology report available for review. He was seeing Dr. Jett (Surgeon) for his upcoming inguinal hernia repair, which his currently on hold due to severe WALLACE. He was told by Dr. Jett that he would require a colon resection for his cancer. \par \par Colonoscopy 10/31/22: \par - Patent end-to end ileocolonic anastomosis, characterized by healthy appearing mucosa. \par - Diverticulosis in the sigmoid colon \par - One 30 mm polyp in the sigmoid colon s/p polypectomy. \par - Non-bleeding external and internal hemorrhoids. \par \par Currently denies any chest pain, sob, palpitations, dizziness, abdominal pain, n/v/d/c, melena, hematochezia, hematuria, weight loss, lack of appetite. Never had a colonoscopy in the past. \par \par PMH: perforated ulcer s/p gastrectomy (2019), H.pylori s/p treatment, PE (provoked in rehab post surgery). Right inguinal hernia. \par PSH: Hernia repair, gastrectomy \par FH: No family history of cancer \par SH: Former smoker, quit 20 year ago. Used to smoke 1 pack per week x 15 years. Denies any alcohol or drug use. Lives alone.

## 2023-02-02 NOTE — ASSESSMENT
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 \par \par 1) Iron deficiency anemia\par - labs in the hospital 9/2022 showed Hb 5.8 and iron studies with severe deficiency (Tsat 6%, ferritin 40). s/p 2-3 units PRBC and IV iron. Post-discharge 12/16/22 showing Hgb 7.2 and ferritin 29. \par - Receiving IV iron weekly x 4 doses + B12 IM injection weekly x 4 doses (started 12/28/22).  Last dose of IV iron and weekly B12 planned today\par - reviewed CBC - Today up to Hgb 8.9\par - start PO iron supplementation ferrous sulfate 325 mg po daily\par - repeat iron stores after 2-3 month interval as long as hgb doesn't drop further.\par \par 2) Severe B12 deficiency\par 12/16/22 B12  <150\par - B12 IM injection weekly x 4 doses (started 12/28/22)\par - Will transition to oral B12 po daily\par - follow b12 level, low threshold to resume injections\par \par 3) Colon polyp positive for adenocarcinoma\par - He had a colonoscopy on 10/31/22 which showed a sigmoid colon polyp s/p polypectomy\par - Pathology report received today showing adenomatous polyp with at least intramucosal adenocarcinoma and foci suspicious for submucosal invasion\par - Discussed that it has been several months now from time cancer was diagnosed, will need to re-stage with scans, stressed importance of follow up for cancer treatment. He stated he will get scans and follow up with us and surgery regarding next steps of treatment.\par - Will get CT Chest, Abdomen, Pelvis for interval scanning to re-stage\par - Will need follow up with surgery to see what options are available from a surgical standpoint\par \par RTC in 2 weeks after CT scans

## 2023-02-02 NOTE — ASSESSMENT
[FreeTextEntry1] : 78 yo M here for initial evaluation of iron deficiency anemia. \par \par WALLACE \par - labs in the hospital with Hb 5.8 and iron studies with severe deficiency (Tsat 6%, ferritin 40). s/p 2-3 units PRBC and IV iron. \par - CBC, Iron studies and ferritin today. \par - He had a colonoscopy on 10/31/22 which showed a sigmoid colon polyp s/p polypectomy, path per patient c/w cancer. Will obtain results from Dr. Tabor's office. \par - If there is evidence of iron deficiency on today's labs, will recommend giving IV iron x 2 doses given history of gastrectomy leading to possible malabsorption. \par - Advised to continue oral iron supplementation daily. \par \par RTC in 6 weeks.

## 2023-02-02 NOTE — PHYSICAL EXAM
[Fully active, able to carry on all pre-disease performance without restriction] : Status 0 - Fully active, able to carry on all pre-disease performance without restriction [Normal] : affect appropriate [de-identified] : large right sided inguinal hernia

## 2023-02-02 NOTE — END OF VISIT
[] : Fellow [FreeTextEntry3] : \par I saw and evaluated the patient with the Hematology/Oncology fellow, Dr Rausch.  Briefly, the patient is a 77 year old man who presents for follow up of severe combined iron and B12 deficiency anemia.  We have also received outside records that show he was diagnosed with colon cancer on a colonoscopy on 10/31/22.  He has not followed up with surgery and seems reluctant to proceed w/ pre-operative evaluation and surgery for this problem.\par \par Plan:\par 1 WALLACE - completing series of IV iron infusions today - hgb up to 8.9.  start PO iron.  \par 2.  B12 deficiency - completing series of weekly B12 injections today - start PO B12 - follow level\par 3.  Colon cancer - shared w/ pt my worries about the fact that he has not pursued medical care for this problem despite diagnosis > 3 months ago.  We are working to optimize his blood counts.  Obtaining CT c/a/p to r/o the interval development of metastatic disease.  Follow up after CT scans\par - if CT scans are clear - he needs to f/u with colorectal surgery\par - also still needs to follow with cardiology - per notes he needed a stress test and/or cath prior to clearance for colon surgery\par - also needs to check CEA\par - emphasized to patient that neglecting care for this problem is extremely dangerous and life threatening because the cancer will grow and spread;  his best chance is to address this surgically while still at an early stage (if that is still an option)\par \par RTC in 2 weeks with CT c/a/p \par + CBC, CMP, CEA

## 2023-02-02 NOTE — PHYSICAL EXAM
[Fully active, able to carry on all pre-disease performance without restriction] : Status 0 - Fully active, able to carry on all pre-disease performance without restriction [Normal] : affect appropriate [de-identified] : large right sided inguinal hernia

## 2023-02-02 NOTE — HISTORY OF PRESENT ILLNESS
[de-identified] : Mr. Villanueva is a 77 year old male here for initial evaluation for iron deficiency anemia. \par \par He had a recent admission to [facility] after a mechanical fall. Labs significant for severe anemia with Hb of 5.8 s/p 2-3 units of PRBC. Iron studies c/w with severe iron deficiency with Tsat 6% and ferritin of 40. He was treated with IV iron in the hospital. Discharged on oral iron which he takes once a day and PPI. He has a history of hernia repair and subtotal gastrectomy (due to perforated gastric ulcer 2/2 H pylori, 2018 at [place]), was treated for H pylori. He underwent a colonoscopy on 10/31/22 with Dr. Tafoya, had a polyp removed which he reports was positive for cancer, he does not have the pathology report available for review. He was seeing Dr. Jett (Surgeon) for his upcoming inguinal hernia repair, which his currently on hold due to severe WALLACE. He was told by Dr. Jett that he would require a colon resection for his cancer. \par \par Colonoscopy 10/31/22: \par - Patent end-to end ileocolonic anastomosis, characterized by healthy appearing mucosa. \par - Diverticulosis in the sigmoid colon \par - One 30 mm polyp in the sigmoid colon s/p polypectomy. \par - Non-bleeding external and internal hemorrhoids. \par \par Currently denies any chest pain, sob, palpitations, dizziness, abdominal pain, n/v/d/c, melena, hematochezia, hematuria, weight loss, lack of appetite. Never had a colonoscopy in the past. \par \par PMH: perforated ulcer s/p gastrectomy (2019), H.pylori s/p treatment, PE (provoked in rehab post surgery). Right inguinal hernia. \par PSH: Hernia repair, gastrectomy \par FH: No family history of cancer \par SH: Former smoker, quit 20 year ago. Used to smoke 1 pack per week x 15 years. Denies any alcohol or drug use. Lives alone.

## 2023-02-03 LAB — FOLATE SERPL-MCNC: 3.5 NG/ML

## 2023-02-08 ENCOUNTER — RESULT REVIEW (OUTPATIENT)
Age: 78
End: 2023-02-08

## 2023-02-08 ENCOUNTER — OUTPATIENT (OUTPATIENT)
Dept: OUTPATIENT SERVICES | Facility: HOSPITAL | Age: 78
LOS: 1 days | End: 2023-02-08
Payer: MEDICARE

## 2023-02-08 ENCOUNTER — APPOINTMENT (OUTPATIENT)
Dept: CT IMAGING | Facility: HOSPITAL | Age: 78
End: 2023-02-08
Payer: MEDICARE

## 2023-02-08 DIAGNOSIS — Z98.890 OTHER SPECIFIED POSTPROCEDURAL STATES: Chronic | ICD-10-CM

## 2023-02-08 LAB — POCT ISTAT CREATININE: 1 MG/DL — SIGNIFICANT CHANGE UP (ref 0.5–1.3)

## 2023-02-08 PROCEDURE — 82565 ASSAY OF CREATININE: CPT

## 2023-02-08 PROCEDURE — 71260 CT THORAX DX C+: CPT | Mod: MD

## 2023-02-08 PROCEDURE — 74177 CT ABD & PELVIS W/CONTRAST: CPT | Mod: MD

## 2023-02-08 PROCEDURE — 74177 CT ABD & PELVIS W/CONTRAST: CPT | Mod: 26,MD

## 2023-02-08 PROCEDURE — 71260 CT THORAX DX C+: CPT | Mod: 26,MD

## 2023-02-14 NOTE — REVIEW OF SYSTEMS
[Fever] : no fever [Chills] : no chills [Fatigue] : no fatigue [Recent Change In Weight] : ~T no recent weight change [Chest Pain] : no chest pain [Palpitations] : no palpitations [Lower Ext Edema] : no lower extremity edema [Shortness Of Breath] : no shortness of breath [Cough] : no cough [SOB on Exertion] : no shortness of breath during exertion [Abdominal Pain] : no abdominal pain [Vomiting] : no vomiting [Constipation] : no constipation [Diarrhea] : diarrhea [Dizziness] : no dizziness [Difficulty Walking] : no difficulty walking [Negative] : Heme/Lymph [FreeTextEntry7] : no melena, no hematochezia, loose stools intermittently every few days, large inguinal hernia [FreeTextEntry8] : no hematuria

## 2023-02-15 ENCOUNTER — LABORATORY RESULT (OUTPATIENT)
Age: 78
End: 2023-02-15

## 2023-02-15 ENCOUNTER — APPOINTMENT (OUTPATIENT)
Dept: HEMATOLOGY ONCOLOGY | Facility: CLINIC | Age: 78
End: 2023-02-15
Payer: MEDICARE

## 2023-02-15 ENCOUNTER — NON-APPOINTMENT (OUTPATIENT)
Age: 78
End: 2023-02-15

## 2023-02-15 VITALS
HEART RATE: 80 BPM | DIASTOLIC BLOOD PRESSURE: 70 MMHG | SYSTOLIC BLOOD PRESSURE: 150 MMHG | HEIGHT: 72 IN | WEIGHT: 196 LBS | BODY MASS INDEX: 26.55 KG/M2 | RESPIRATION RATE: 18 BRPM | TEMPERATURE: 98.5 F | OXYGEN SATURATION: 99 %

## 2023-02-15 PROCEDURE — 99214 OFFICE O/P EST MOD 30 MIN: CPT | Mod: 25

## 2023-02-15 PROCEDURE — 36415 COLL VENOUS BLD VENIPUNCTURE: CPT

## 2023-02-15 NOTE — HISTORY OF PRESENT ILLNESS
[de-identified] : Mr. Villanueva is a 77 year old male here for follow up of combined B12 and iron deficiency anemia. \par \par He had a 9/2022 admission to [facility] after a mechanical fall. Labs significant for severe anemia with Hb of 5.8 s/p 2-3 units of PRBC. Iron studies c/w with severe iron deficiency with Tsat 6% and ferritin of 40. He was treated with IV iron in the hospital. Discharged on oral iron which he takes once a day and PPI. He has a history of hernia repair and subtotal gastrectomy (due to perforated gastric ulcer 2/2 H pylori, 2018 at [place]), was treated for H pylori. He underwent a colonoscopy on 10/31/22 with Dr. Tafoya, had a polyp removed which he reports was positive for cancer.  At the initial visit we did not have records, but these later became available (listed below). He was seeing Dr. Jett (Surgeon) for his upcoming inguinal hernia repair, which his currently on hold due to severe WALLACE. He was told by Dr. Jett that he would require a colon resection for his cancer. \par \par CT 9/2022 at [facility] - giant inguinal hernia - no findings for malignancy\par \par Colonoscopy 10/31/22: \par - Patent end-to end ileocolonic anastomosis, characterized by healthy appearing mucosa. \par - Diverticulosis in the sigmoid colon \par - One 30 mm polyp in the sigmoid colon s/p polypectomy. \par - Non-bleeding external and internal hemorrhoids. \par \par Pathology 10/31/22:\par sigmoid colon polyp s/p polypectomy,  at least intramucosal adenocarcinoma and foci suspicious for submucosal invasion\par \par Received IV Iron (venofer) 3/4 300 mg doses - starting 12/2022\par B12 deficiency - repleted with weekly B12 injections - starting 12/2022\par \par \par PMH: perforated ulcer s/p gastrectomy (2019), H.pylori s/p treatment, PE (provoked in rehab post surgery). Right inguinal hernia. \par PSH: Hernia repair, gastrectomy \par FH: No family history of cancer \par SH: Former smoker, quit 20 year ago. Used to smoke 1 pack per week x 15 years. Denies any alcohol or drug use. Lives alone in [facility]\par \par 2/8/23 CT Chest: 1. Large right inguinal hernia containing nonobstructed loops of small bowel and a large cystic structure; possibly a massive hydrocele.\par 2.  Moderate right hydronephrosis. Distal right ureter extrinsically compressed within the superior aspect of the right inguinal hernia.\par \par \par  [de-identified] : here for follow up to discuss CT scan results.\par taking iron, never picked up B12 Rx at the pharmacy.

## 2023-02-15 NOTE — END OF VISIT
[] : Fellow [FreeTextEntry3] : \par I saw and evaluated the patient with the Hematology/Oncology fellow, Dr Rausch.  Briefly, the patient is a 77 year old man who presents for follow up of severe combined iron and B12 deficiency anemia.  We have also received outside records that show he was diagnosed with colon cancer on a colonoscopy on 10/31/22.  He has not followed up with surgery and seems reluctant to proceed w/ pre-operative evaluation and surgery for this problem.\par \par Plan:\par 1 WALLACE - completing series of IV iron infusions today - hgb up to 8.9.  start PO iron.  \par 2.  B12 deficiency - completing series of weekly B12 injections today - start PO B12 - follow level\par 3.  Colon cancer - shared w/ pt my worries about the fact that he has not pursued medical care for this problem despite diagnosis > 3 months ago.  We are working to optimize his blood counts.  Obtaining CT c/a/p to r/o the interval development of metastatic disease.  Follow up after CT scans\par - if CT scans are clear - he needs to f/u with colorectal surgery\par - also still needs to follow with cardiology - per notes he needed a stress test and/or cath prior to clearance for colon surgery\par - also needs to check CEA\par - emphasized to patient that neglecting care for this problem is extremely dangerous and life threatening because the cancer will grow and spread;  his best chance is to address this surgically while still at an early stage (if that is still an option)\par \par RTC in 2 weeks with CT c/a/p \par + CBC, CMP, CEA [Time Spent: ___ minutes] : I have spent [unfilled] minutes of time on the encounter.

## 2023-02-15 NOTE — ASSESSMENT
[FreeTextEntry1] : 76 yo M here for follow up of severe combined iron and B12 deficiency anemia. \par Was also found to have "at least intramucosal adenocarcinoma" in a sigmoid colon polyp on colonoscopy 10/31/23.\par \par 1) Iron deficiency anemia\par - labs in the hospital 9/2022 showed Hb 5.8 and iron studies with severe deficiency (Tsat 6%, ferritin 40). s/p 2-3 units PRBC and IV iron. Post-discharge 12/16/22 showing Hgb 7.2 and ferritin 29. \par - Receiving IV iron weekly x 4 doses + B12 IM injection weekly x 4 doses (started 12/28/22).  Last dose of IV iron and weekly B12 2/2/23\par - reviewed CBC - Today up to Hgb 9.4\par - continue PO iron supplementation ferrous sulfate 325 mg po daily\par - repeat iron stores after 2-3 month interval as long as hgb doesn't drop further.\par \par 2) Severe B12 deficiency\par 12/16/22 B12  <150\par - B12 IM injection weekly x 4 doses (started 12/28/22, completed 2/2/23)\par - Will transition to oral B12 po daily - reminded pt to  Rx at pharmacy.\par - follow b12 level, low threshold to resume injections\par \par 3) Colon polyp positive for adenocarcinoma\par - He had a colonoscopy on 10/31/22 which showed a sigmoid colon polyp s/p polypectomy\par - Pathology report received today showing adenomatous polyp with at least intramucosal adenocarcinoma and foci suspicious for submucosal invasion\par - CT c/a/p negative for metastatic disease\par - CEA pending today\par - pt met with a surgeon, Dr Jett, who recommended resection.  He wants to know if this is the only option.  he shared that he is not prepared to make a decision about having treatment.  Emphasized to patient that this appears to be early stage disease based on presently available information;  may be very curable.  Emphasized need for follow up with colorectal surgeon to discuss options;  offered second opinion.  He emphatically declined, stating he needed more time to make a decision.  Discussed risks of delays in care, tumor progression, metastasis, risk of death from cancer if not treated.  I believe the patient understands these risks but is making a decision on his own terms to delay care.\par --asked him to call when he decides how he wishes to proceed, if he would like us to help him get plugged back in with Dr Jett or get another opinion\par \par 4) low folate level - Rx sent\par \par Oncology SW, Katelyn Borja NP also  present for the visit and supported the patient. \par \par RTC in 2 months with CBC, B12, folate, ferritin, iron panel.

## 2023-02-15 NOTE — PHYSICAL EXAM
[Restricted in physically strenuous activity but ambulatory and able to carry out work of a light or sedentary nature] : Status 1- Restricted in physically strenuous activity but ambulatory and able to carry out work of a light or sedentary nature, e.g., light house work, office work [Normal] : affect appropriate [de-identified] : supple [de-identified] : normal RR, breathing pattern [de-identified] : normal HR [de-identified] : giant right sided inguinal hernia

## 2023-02-16 LAB — CEA SERPL-MCNC: 1.1 NG/ML

## 2023-03-03 ENCOUNTER — NON-APPOINTMENT (OUTPATIENT)
Age: 78
End: 2023-03-03

## 2023-03-07 ENCOUNTER — NON-APPOINTMENT (OUTPATIENT)
Age: 78
End: 2023-03-07

## 2023-04-13 ENCOUNTER — APPOINTMENT (OUTPATIENT)
Dept: HEMATOLOGY ONCOLOGY | Facility: CLINIC | Age: 78
End: 2023-04-13
Payer: MEDICARE

## 2023-04-20 ENCOUNTER — APPOINTMENT (OUTPATIENT)
Dept: HEMATOLOGY ONCOLOGY | Facility: CLINIC | Age: 78
End: 2023-04-20
Payer: MEDICARE

## 2023-04-20 ENCOUNTER — LABORATORY RESULT (OUTPATIENT)
Age: 78
End: 2023-04-20

## 2023-04-20 VITALS
OXYGEN SATURATION: 97 % | HEIGHT: 72 IN | DIASTOLIC BLOOD PRESSURE: 71 MMHG | TEMPERATURE: 97.6 F | HEART RATE: 151 BPM | RESPIRATION RATE: 18 BRPM | BODY MASS INDEX: 26.55 KG/M2 | SYSTOLIC BLOOD PRESSURE: 115 MMHG | WEIGHT: 196 LBS

## 2023-04-20 DIAGNOSIS — C18.9 MALIGNANT NEOPLASM OF COLON, UNSPECIFIED: ICD-10-CM

## 2023-04-20 PROCEDURE — 99214 OFFICE O/P EST MOD 30 MIN: CPT | Mod: 25

## 2023-04-20 PROCEDURE — 36415 COLL VENOUS BLD VENIPUNCTURE: CPT

## 2023-04-24 PROBLEM — C18.9 ADENOCARCINOMA OF COLON: Status: ACTIVE | Noted: 2023-02-02

## 2023-04-24 LAB
FERRITIN SERPL-MCNC: 58 NG/ML
FOLATE SERPL-MCNC: 2.2 NG/ML
IRON SATN MFR SERPL: 10 %
IRON SERPL-MCNC: 33 UG/DL
TIBC SERPL-MCNC: 341 UG/DL
UIBC SERPL-MCNC: 308 UG/DL
VIT B12 SERPL-MCNC: 182 PG/ML

## 2023-04-24 NOTE — PHYSICAL EXAM
[Restricted in physically strenuous activity but ambulatory and able to carry out work of a light or sedentary nature] : Status 1- Restricted in physically strenuous activity but ambulatory and able to carry out work of a light or sedentary nature, e.g., light house work, office work [Normal] : affect appropriate [de-identified] : supple [de-identified] : normal RR, breathing pattern [de-identified] : normal HR [de-identified] : giant right sided inguinal hernia

## 2023-04-24 NOTE — HISTORY OF PRESENT ILLNESS
[de-identified] : Mr. Villanueva is a 77 year old male here for follow up of combined B12 and iron deficiency anemia. \par \par He had a 9/2022 admission to [facility] after a mechanical fall. Labs significant for severe anemia with Hb of 5.8 s/p 2-3 units of PRBC. Iron studies c/w with severe iron deficiency with Tsat 6% and ferritin of 40. He was treated with IV iron in the hospital. Discharged on oral iron which he takes once a day and PPI. He has a history of hernia repair and subtotal gastrectomy (due to perforated gastric ulcer 2/2 H pylori, 2018 at [place]), was treated for H pylori. He underwent a colonoscopy on 10/31/22 with Dr. Tafoya, had a polyp removed which he reports was positive for cancer.  At the initial visit we did not have records, but these later became available (listed below). He was seeing Dr. Jett (Surgeon) for his upcoming inguinal hernia repair, which his currently on hold due to severe WALLACE. He was told by Dr. Jett that he would require a colon resection for his cancer. \par \par CT 9/2022 at [facility] - giant inguinal hernia - no findings for malignancy\par \par Colonoscopy 10/31/22: \par - Patent end-to end ileocolonic anastomosis, characterized by healthy appearing mucosa. \par - Diverticulosis in the sigmoid colon \par - One 30 mm polyp in the sigmoid colon s/p polypectomy. \par - Non-bleeding external and internal hemorrhoids. \par \par Pathology 10/31/22:\par sigmoid colon polyp s/p polypectomy,  at least intramucosal adenocarcinoma and foci suspicious for submucosal invasion\par \par Received IV Iron (venofer) 3/4 300 mg doses - starting 12/2022 through 2/2023\par B12 deficiency - repleted with weekly B12 injections - starting 12/2022\par \par \par PMH: perforated ulcer s/p gastrectomy (2019), H.pylori s/p treatment, PE (provoked in rehab post surgery). Right inguinal hernia. \par PSH: Hernia repair, gastrectomy \par FH: No family history of cancer \par SH: Former smoker, quit 20 year ago. Used to smoke 1 pack per week x 15 years. Denies any alcohol or drug use. Lives alone in [facility]\par \par 2/8/23 CT Chest: 1. Large right inguinal hernia containing nonobstructed loops of small bowel and a large cystic structure; possibly a massive hydrocele.\par 2.  Moderate right hydronephrosis. Distal right ureter extrinsically compressed within the superior aspect of the right inguinal hernia.\par \par \par  [de-identified] : here for follow up \par still has not seen surgeon\par saw cardiologist and he reports he was told he does not need a cardiac cath.\par he is considering seeing Dr Keith for his colon cancer.  He continues to have reservations about a surgery.\par taking iron, never picked up B12 Rx at the pharmacy.\par "I feel fine"\par \par pt's HR was elevated at the office visit.  he was evaluated by NP.  EKG performed and showed NSR with normal HR

## 2023-04-24 NOTE — ASSESSMENT
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 \par Was also found to have "at least intramucosal adenocarcinoma" in a sigmoid colon polyp on colonoscopy 10/31/23.  Lost to follow up with surgeon.  CT c/a/p 2/2023 negative for metastatic disease and did not demonstrate malignancy. CEA was also normal.\par \par 1) Iron deficiency anemia\par - Received IV iron weekly x 4 doses + B12 IM injection weekly x 4 doses (started 12/28/22).  Last dose of IV iron and weekly B12 2/2/23\par - reviewed CBC - Today up to Hgb 10.5\par - continue PO iron supplementation ferrous sulfate 325 mg po daily.  Iron stores continue to improve.\par - no indication for re-treatment with IV iron.\par \par 2) Severe B12 deficiency\par 12/16/22 B12  <150\par - B12 IM injection weekly x 4 doses (started 12/28/22, completed 2/2/23)\par - Was supposed to transition to oral B12 po daily - he has not started taking.  strongly encouraged him to do so\par - follow b12 level, low threshold to resume injections.  B12 level resulted low again so will recommend supplemental B12.\par \par 3) Colon polyp positive for adenocarcinoma\par - He had a colonoscopy on 10/31/22 which showed a sigmoid colon polyp s/p polypectomy\par - Pathology report received today showing adenomatous polyp with at least intramucosal adenocarcinoma and foci suspicious for submucosal invasion\par - CT c/a/p negative for metastatic disease and CEA normal 2/2023\par - pt met with a surgeon, Dr Jett, who according to the patient recommended resection. patient is reluctant to consider surgery and has shared this with us multiple times.  Emphasized to patient that this appears to be early stage disease based on presently available information;  may be very curable.  Emphasized need for follow up with colorectal surgeon to discuss options;  offered second opinion.  He emphatically declined at previous visit and again today.  He says he intends to see Dr Keith about the malignant colon polyp.  I again recommended that he see a colorectal surgeon.  Discussed risks of delays in care, tumor progression, metastasis, risk of death from cancer if not treated.  I believe the patient understands these risks but is making a decision on his own terms to delay care.\par \par 4) low folate level - pending at time of office visit - patient says he is taking.\par \par RTC in 2-3 months with CBC, B12, folate, ferritin, iron panel.

## 2023-04-25 ENCOUNTER — NON-APPOINTMENT (OUTPATIENT)
Age: 78
End: 2023-04-25

## 2023-04-27 RX ORDER — PREGABALIN 225 MG/1
1000 CAPSULE ORAL ONCE
Refills: 0 | Status: COMPLETED | OUTPATIENT
Start: 2023-08-17 | End: 2023-08-17

## 2023-04-28 ENCOUNTER — APPOINTMENT (OUTPATIENT)
Dept: INFUSION THERAPY | Facility: CLINIC | Age: 78
End: 2023-04-28

## 2023-04-28 ENCOUNTER — OUTPATIENT (OUTPATIENT)
Dept: OUTPATIENT SERVICES | Facility: HOSPITAL | Age: 78
LOS: 1 days | End: 2023-04-28
Payer: MEDICARE

## 2023-04-28 VITALS
RESPIRATION RATE: 16 BRPM | DIASTOLIC BLOOD PRESSURE: 69 MMHG | WEIGHT: 190.04 LBS | HEIGHT: 72 IN | SYSTOLIC BLOOD PRESSURE: 146 MMHG | OXYGEN SATURATION: 97 % | TEMPERATURE: 99 F | HEART RATE: 59 BPM

## 2023-04-28 DIAGNOSIS — Z98.890 OTHER SPECIFIED POSTPROCEDURAL STATES: Chronic | ICD-10-CM

## 2023-04-28 DIAGNOSIS — D50.9 IRON DEFICIENCY ANEMIA, UNSPECIFIED: ICD-10-CM

## 2023-04-28 PROCEDURE — 96372 THER/PROPH/DIAG INJ SC/IM: CPT

## 2023-04-28 RX ORDER — PREGABALIN 225 MG/1
1000 CAPSULE ORAL ONCE
Refills: 0 | Status: COMPLETED | OUTPATIENT
Start: 2023-04-28 | End: 2023-04-28

## 2023-04-28 RX ADMIN — PREGABALIN 1000 MICROGRAM(S): 225 CAPSULE ORAL at 15:29

## 2023-05-26 ENCOUNTER — OUTPATIENT (OUTPATIENT)
Dept: OUTPATIENT SERVICES | Facility: HOSPITAL | Age: 78
LOS: 1 days | End: 2023-05-26
Payer: MEDICARE

## 2023-05-26 ENCOUNTER — APPOINTMENT (OUTPATIENT)
Dept: INFUSION THERAPY | Facility: CLINIC | Age: 78
End: 2023-05-26

## 2023-05-26 VITALS
WEIGHT: 190.92 LBS | OXYGEN SATURATION: 98 % | SYSTOLIC BLOOD PRESSURE: 138 MMHG | DIASTOLIC BLOOD PRESSURE: 70 MMHG | RESPIRATION RATE: 17 BRPM | HEIGHT: 72 IN | HEART RATE: 60 BPM | TEMPERATURE: 98 F

## 2023-05-26 DIAGNOSIS — Z98.890 OTHER SPECIFIED POSTPROCEDURAL STATES: Chronic | ICD-10-CM

## 2023-05-26 DIAGNOSIS — D50.9 IRON DEFICIENCY ANEMIA, UNSPECIFIED: ICD-10-CM

## 2023-05-26 PROCEDURE — 96372 THER/PROPH/DIAG INJ SC/IM: CPT

## 2023-05-26 RX ORDER — PREGABALIN 225 MG/1
1000 CAPSULE ORAL ONCE
Refills: 0 | Status: COMPLETED | OUTPATIENT
Start: 2023-05-26 | End: 2023-05-26

## 2023-05-26 RX ADMIN — PREGABALIN 1000 MICROGRAM(S): 225 CAPSULE ORAL at 15:48

## 2023-06-23 ENCOUNTER — OUTPATIENT (OUTPATIENT)
Dept: OUTPATIENT SERVICES | Facility: HOSPITAL | Age: 78
LOS: 1 days | End: 2023-06-23
Payer: MEDICARE

## 2023-06-23 ENCOUNTER — APPOINTMENT (OUTPATIENT)
Dept: INFUSION THERAPY | Facility: CLINIC | Age: 78
End: 2023-06-23

## 2023-06-23 VITALS
RESPIRATION RATE: 18 BRPM | HEART RATE: 60 BPM | TEMPERATURE: 98 F | OXYGEN SATURATION: 99 % | HEIGHT: 72 IN | SYSTOLIC BLOOD PRESSURE: 120 MMHG | WEIGHT: 190.92 LBS | DIASTOLIC BLOOD PRESSURE: 74 MMHG

## 2023-06-23 DIAGNOSIS — Z98.890 OTHER SPECIFIED POSTPROCEDURAL STATES: Chronic | ICD-10-CM

## 2023-06-23 DIAGNOSIS — D50.9 IRON DEFICIENCY ANEMIA, UNSPECIFIED: ICD-10-CM

## 2023-06-23 PROCEDURE — 96372 THER/PROPH/DIAG INJ SC/IM: CPT

## 2023-06-23 RX ORDER — PREGABALIN 225 MG/1
1000 CAPSULE ORAL ONCE
Refills: 0 | Status: COMPLETED | OUTPATIENT
Start: 2023-06-23 | End: 2023-06-23

## 2023-06-23 RX ADMIN — PREGABALIN 1000 MICROGRAM(S): 225 CAPSULE ORAL at 15:30

## 2023-07-20 ENCOUNTER — OUTPATIENT (OUTPATIENT)
Dept: OUTPATIENT SERVICES | Facility: HOSPITAL | Age: 78
LOS: 1 days | End: 2023-07-20
Payer: MEDICARE

## 2023-07-20 ENCOUNTER — APPOINTMENT (OUTPATIENT)
Dept: HEMATOLOGY ONCOLOGY | Facility: CLINIC | Age: 78
End: 2023-07-20
Payer: MEDICARE

## 2023-07-20 ENCOUNTER — APPOINTMENT (OUTPATIENT)
Dept: INFUSION THERAPY | Facility: CLINIC | Age: 78
End: 2023-07-20

## 2023-07-20 VITALS
HEIGHT: 72 IN | WEIGHT: 191 LBS | DIASTOLIC BLOOD PRESSURE: 67 MMHG | RESPIRATION RATE: 18 BRPM | SYSTOLIC BLOOD PRESSURE: 125 MMHG | HEART RATE: 71 BPM | TEMPERATURE: 96.9 F | BODY MASS INDEX: 25.87 KG/M2 | OXYGEN SATURATION: 98 %

## 2023-07-20 VITALS
TEMPERATURE: 98 F | OXYGEN SATURATION: 98 % | DIASTOLIC BLOOD PRESSURE: 70 MMHG | HEART RATE: 60 BPM | SYSTOLIC BLOOD PRESSURE: 119 MMHG | RESPIRATION RATE: 18 BRPM

## 2023-07-20 DIAGNOSIS — Z98.890 OTHER SPECIFIED POSTPROCEDURAL STATES: Chronic | ICD-10-CM

## 2023-07-20 LAB
FERRITIN SERPL-MCNC: 64 NG/ML
IRON SATN MFR SERPL: 17 %
IRON SERPL-MCNC: 54 UG/DL
TIBC SERPL-MCNC: 312 UG/DL
UIBC SERPL-MCNC: 258 UG/DL

## 2023-07-20 PROCEDURE — 99213 OFFICE O/P EST LOW 20 MIN: CPT | Mod: 25

## 2023-07-20 PROCEDURE — 36415 COLL VENOUS BLD VENIPUNCTURE: CPT

## 2023-07-20 PROCEDURE — 96372 THER/PROPH/DIAG INJ SC/IM: CPT

## 2023-07-20 RX ORDER — PREGABALIN 225 MG/1
1000 CAPSULE ORAL ONCE
Refills: 0 | Status: COMPLETED | OUTPATIENT
Start: 2023-07-20 | End: 2023-07-20

## 2023-07-20 RX ADMIN — PREGABALIN 1000 MICROGRAM(S): 225 CAPSULE ORAL at 17:30

## 2023-07-21 ENCOUNTER — NON-APPOINTMENT (OUTPATIENT)
Age: 78
End: 2023-07-21

## 2023-07-21 DIAGNOSIS — D50.9 IRON DEFICIENCY ANEMIA, UNSPECIFIED: ICD-10-CM

## 2023-07-23 NOTE — HISTORY OF PRESENT ILLNESS
[de-identified] : Mr. Villanueva is a 77 year old male here for follow up of combined B12 and iron deficiency anemia. \par \par He had a 9/2022 admission to [facility] after a mechanical fall. Labs significant for severe anemia with Hb of 5.8 s/p 2-3 units of PRBC. Iron studies c/w with severe iron deficiency with Tsat 6% and ferritin of 40. He was treated with IV iron in the hospital. Discharged on oral iron which he takes once a day and PPI. He has a history of hernia repair and subtotal gastrectomy (due to perforated gastric ulcer 2/2 H pylori, 2018 at [place]), was treated for H pylori. He underwent a colonoscopy on 10/31/22 with Dr. Tafoya, had a polyp removed which he reports was positive for cancer.  At the initial visit we did not have records, but these later became available (listed below). He was seeing Dr. Jett (Surgeon) for his upcoming inguinal hernia repair, which his currently on hold due to severe WALLACE. He was told by Dr. Jett that he would require a colon resection for his cancer. \par \par CT 9/2022 at [facility] - giant inguinal hernia - no findings for malignancy\par \par Colonoscopy 10/31/22: \par - Patent end-to end ileocolonic anastomosis, characterized by healthy appearing mucosa. \par - Diverticulosis in the sigmoid colon \par - One 30 mm polyp in the sigmoid colon s/p polypectomy. \par - Non-bleeding external and internal hemorrhoids. \par \par Pathology 10/31/22:\par sigmoid colon polyp s/p polypectomy,  at least intramucosal adenocarcinoma and foci suspicious for submucosal invasion\par \par Received IV Iron (venofer) 3/4 300 mg doses - starting 12/2022 through 2/2023\par B12 deficiency - repleted with weekly B12 injections - starting 12/2022\par \par \par PMH: perforated ulcer s/p gastrectomy (2019), H.pylori s/p treatment, PE (provoked in rehab post surgery). Right inguinal hernia. \par PSH: Hernia repair, gastrectomy \par FH: No family history of cancer \par SH: Former smoker, quit 20 year ago. Used to smoke 1 pack per week x 15 years. Denies any alcohol or drug use. Lives alone in [facility]\par \par 2/8/23 CT Chest: 1. Large right inguinal hernia containing nonobstructed loops of small bowel and a large cystic structure; possibly a massive hydrocele.\par 2.  Moderate right hydronephrosis. Distal right ureter extrinsically compressed within the superior aspect of the right inguinal hernia.\par \par \par  [de-identified] : Here for follow up \par still has not seen surgeon or Dr Keith for colon cancer. He continues to have reservations about a surgery.\par he states he takes B12/Folic acid /Iron PO daily or EOD and denies any problems including nausea, vomiting, diarrhea, constipation, any pain, or blood in stool.  \par he feels fine w/ good energy level, lost some weight since last visit. No new symptoms. \par on monthly B12 shot\par \par

## 2023-07-23 NOTE — ASSESSMENT
[FreeTextEntry1] : 76 yo M here for follow up of severe combined iron and B12 deficiency anemia. \par Was also found to have "at least intramucosal adenocarcinoma" in a sigmoid colon polyp on colonoscopy 10/31/23.  Lost to follow up with surgeon.  CT c/a/p 2/2023 negative for metastatic disease and did not demonstrate malignancy. CEA was also normal.\par \par 1) Iron deficiency anemia\par - Received IV iron weekly x 4 doses + B12 IM injection weekly x 4 doses (started 12/28/22).  Last dose of IV iron and weekly B12 2/2/23\par - reviewed CBC - Hgb 10.3, stable \par - continue PO iron supplementation ferrous sulfate 325 mg po daily.  Iron stores continue to improve, Ferritin 64 today\par - no indication for re-treatment with IV iron.\par -he is feeling ok w/o new symptoms since last visit. \par \par 2) h/o severe B12 deficiency\par -12/16/22 B12  <150, 182 on 4/20/23, 389 today \par -B12 IM injection weekly x 4 doses (started 12/28/22, completed 2/2/23), now on B12 1000mcg monthly since 4/2023\par - continue B12 PO and shot monthly, will give B12 shot today \par \par 3) Colon polyp positive for adenocarcinoma\par - He had a colonoscopy on 10/31/22 which showed a sigmoid colon polyp s/p polypectomy\par - Pathology report received today showing adenomatous polyp with at least intramucosal adenocarcinoma and foci suspicious for submucosal invasion\par - CT c/a/p negative for metastatic disease and CEA normal 2/2023\par - pt met with a surgeon, Dr Jett, who according to the patient recommended resection. patient is reluctant to consider surgery and has shared this with us multiple times.  Emphasized to patient that this appears to be early stage disease based on presently available information;  may be very curable.  Emphasized need for follow up with colorectal surgeon to discuss options;  offered second opinion.  He emphatically declined at previous visit and again today.  He says he intends to see Dr Keith about the malignant colon polyp.  I again recommended that he see a colorectal surgeon.  Discussed risks of delays in care, tumor progression, metastasis, risk of death from cancer if not treated.  I believe the patient understands these risks but is making a decision on his own terms to delay care.\par --still has not seen surgeon or Dr Keith since last visit. \par \par 4) h/o low folate level \par -folate 2.2 on 4/20, 9.4 today\par -continue folic acid 1mg daily \par \par Plan discussed with Dr. Christopher \par RTC in 3 months with CBC, B12, folate, ferritin, iron panel.

## 2023-07-23 NOTE — PHYSICAL EXAM
[Restricted in physically strenuous activity but ambulatory and able to carry out work of a light or sedentary nature] : Status 1- Restricted in physically strenuous activity but ambulatory and able to carry out work of a light or sedentary nature, e.g., light house work, office work [Normal] : normoactive bowel sounds, soft and nontender, no hepatosplenomegaly or masses appreciated [de-identified] : supple [de-identified] : normal RR, breathing pattern [de-identified] : normal HR [de-identified] : giant right sided inguinal hernia

## 2023-07-23 NOTE — REVIEW OF SYSTEMS
[Recent Change In Weight] : ~T recent weight change [Diarrhea: Grade 0] : Diarrhea: Grade 0 [Negative] : Allergic/Immunologic [Fever] : no fever [Chills] : no chills [Night Sweats] : no night sweats [Fatigue] : no fatigue [Vomiting] : no vomiting [Abdominal Pain] : no abdominal pain [Constipation] : no constipation [FreeTextEntry2] : weight loss, states eating is fine  [FreeTextEntry7] : large inguinal hernia

## 2023-07-25 LAB
FOLATE SERPL-MCNC: 9.4 NG/ML
VIT B12 SERPL-MCNC: 389 PG/ML

## 2023-08-17 ENCOUNTER — APPOINTMENT (OUTPATIENT)
Dept: INFUSION THERAPY | Facility: CLINIC | Age: 78
End: 2023-08-17

## 2023-08-17 ENCOUNTER — OUTPATIENT (OUTPATIENT)
Dept: OUTPATIENT SERVICES | Facility: HOSPITAL | Age: 78
LOS: 1 days | End: 2023-08-17
Payer: MEDICARE

## 2023-08-17 VITALS
TEMPERATURE: 97 F | RESPIRATION RATE: 18 BRPM | HEART RATE: 78 BPM | DIASTOLIC BLOOD PRESSURE: 69 MMHG | SYSTOLIC BLOOD PRESSURE: 120 MMHG | OXYGEN SATURATION: 97 %

## 2023-08-17 DIAGNOSIS — D50.9 IRON DEFICIENCY ANEMIA, UNSPECIFIED: ICD-10-CM

## 2023-08-17 DIAGNOSIS — Z98.890 OTHER SPECIFIED POSTPROCEDURAL STATES: Chronic | ICD-10-CM

## 2023-08-17 PROCEDURE — 96372 THER/PROPH/DIAG INJ SC/IM: CPT

## 2023-08-17 RX ADMIN — PREGABALIN 1000 MICROGRAM(S): 225 CAPSULE ORAL at 15:50

## 2023-09-13 RX ORDER — PREGABALIN 225 MG/1
1000 CAPSULE ORAL ONCE
Refills: 0 | Status: COMPLETED | OUTPATIENT
Start: 2024-01-12 | End: 2024-01-12

## 2023-09-13 RX ORDER — PREGABALIN 225 MG/1
1000 CAPSULE ORAL ONCE
Refills: 0 | Status: COMPLETED | OUTPATIENT
Start: 2024-05-23 | End: 2024-05-23

## 2023-09-14 ENCOUNTER — OUTPATIENT (OUTPATIENT)
Dept: OUTPATIENT SERVICES | Facility: HOSPITAL | Age: 78
LOS: 1 days | End: 2023-09-14
Payer: MEDICARE

## 2023-09-14 ENCOUNTER — APPOINTMENT (OUTPATIENT)
Dept: INFUSION THERAPY | Facility: CLINIC | Age: 78
End: 2023-09-14

## 2023-09-14 VITALS
OXYGEN SATURATION: 99 % | TEMPERATURE: 97 F | HEART RATE: 74 BPM | RESPIRATION RATE: 18 BRPM | DIASTOLIC BLOOD PRESSURE: 74 MMHG | SYSTOLIC BLOOD PRESSURE: 117 MMHG

## 2023-09-14 DIAGNOSIS — Z98.890 OTHER SPECIFIED POSTPROCEDURAL STATES: Chronic | ICD-10-CM

## 2023-09-14 DIAGNOSIS — D50.9 IRON DEFICIENCY ANEMIA, UNSPECIFIED: ICD-10-CM

## 2023-09-14 PROCEDURE — 96372 THER/PROPH/DIAG INJ SC/IM: CPT

## 2023-09-14 RX ORDER — PREGABALIN 225 MG/1
1000 CAPSULE ORAL ONCE
Refills: 0 | Status: COMPLETED | OUTPATIENT
Start: 2023-09-14 | End: 2023-09-14

## 2023-09-14 RX ADMIN — PREGABALIN 1000 MICROGRAM(S): 225 CAPSULE ORAL at 16:02

## 2023-11-09 ENCOUNTER — APPOINTMENT (OUTPATIENT)
Dept: INFUSION THERAPY | Facility: CLINIC | Age: 78
End: 2023-11-09

## 2023-11-09 ENCOUNTER — OUTPATIENT (OUTPATIENT)
Dept: OUTPATIENT SERVICES | Facility: HOSPITAL | Age: 78
LOS: 1 days | End: 2023-11-09
Payer: MEDICARE

## 2023-11-09 ENCOUNTER — LABORATORY RESULT (OUTPATIENT)
Age: 78
End: 2023-11-09

## 2023-11-09 ENCOUNTER — APPOINTMENT (OUTPATIENT)
Dept: HEMATOLOGY ONCOLOGY | Facility: CLINIC | Age: 78
End: 2023-11-09
Payer: MEDICARE

## 2023-11-09 VITALS
HEIGHT: 72 IN | HEART RATE: 69 BPM | SYSTOLIC BLOOD PRESSURE: 157 MMHG | DIASTOLIC BLOOD PRESSURE: 72 MMHG | TEMPERATURE: 97 F | RESPIRATION RATE: 18 BRPM | OXYGEN SATURATION: 98 % | WEIGHT: 182.1 LBS

## 2023-11-09 VITALS
DIASTOLIC BLOOD PRESSURE: 72 MMHG | WEIGHT: 182 LBS | TEMPERATURE: 97.2 F | HEART RATE: 69 BPM | BODY MASS INDEX: 24.65 KG/M2 | OXYGEN SATURATION: 98 % | HEIGHT: 72 IN | SYSTOLIC BLOOD PRESSURE: 157 MMHG | RESPIRATION RATE: 18 BRPM

## 2023-11-09 DIAGNOSIS — Z98.890 OTHER SPECIFIED POSTPROCEDURAL STATES: Chronic | ICD-10-CM

## 2023-11-09 DIAGNOSIS — D50.9 IRON DEFICIENCY ANEMIA, UNSPECIFIED: ICD-10-CM

## 2023-11-09 DIAGNOSIS — D52.9 FOLATE DEFICIENCY ANEMIA, UNSPECIFIED: ICD-10-CM

## 2023-11-09 LAB
FERRITIN SERPL-MCNC: 58 NG/ML
IRON SATN MFR SERPL: 9 %
IRON SERPL-MCNC: 30 UG/DL
TIBC SERPL-MCNC: 326 UG/DL
UIBC SERPL-MCNC: 296 UG/DL

## 2023-11-09 PROCEDURE — 36415 COLL VENOUS BLD VENIPUNCTURE: CPT

## 2023-11-09 PROCEDURE — 96372 THER/PROPH/DIAG INJ SC/IM: CPT

## 2023-11-09 PROCEDURE — 99213 OFFICE O/P EST LOW 20 MIN: CPT | Mod: 25

## 2023-11-09 RX ORDER — PREGABALIN 225 MG/1
1000 CAPSULE ORAL ONCE
Refills: 0 | Status: COMPLETED | OUTPATIENT
Start: 2023-11-09 | End: 2023-11-09

## 2023-11-09 RX ADMIN — PREGABALIN 1000 MICROGRAM(S): 225 CAPSULE ORAL at 14:01

## 2023-11-10 LAB
FOLATE SERPL-MCNC: 16.9 NG/ML
VIT B12 SERPL-MCNC: 476 PG/ML

## 2023-11-11 PROBLEM — D52.9 FOLATE-DEFICIENCY ANEMIA: Status: ACTIVE | Noted: 2023-02-15

## 2023-12-07 ENCOUNTER — OUTPATIENT (OUTPATIENT)
Dept: OUTPATIENT SERVICES | Facility: HOSPITAL | Age: 78
LOS: 1 days | End: 2023-12-07
Payer: MEDICARE

## 2023-12-07 ENCOUNTER — APPOINTMENT (OUTPATIENT)
Dept: INFUSION THERAPY | Facility: CLINIC | Age: 78
End: 2023-12-07

## 2023-12-07 VITALS
HEART RATE: 79 BPM | OXYGEN SATURATION: 98 % | TEMPERATURE: 97 F | SYSTOLIC BLOOD PRESSURE: 132 MMHG | DIASTOLIC BLOOD PRESSURE: 69 MMHG | RESPIRATION RATE: 18 BRPM

## 2023-12-07 VITALS
SYSTOLIC BLOOD PRESSURE: 149 MMHG | TEMPERATURE: 97 F | HEIGHT: 72 IN | RESPIRATION RATE: 18 BRPM | WEIGHT: 192.02 LBS | DIASTOLIC BLOOD PRESSURE: 83 MMHG | HEART RATE: 81 BPM | OXYGEN SATURATION: 99 %

## 2023-12-07 DIAGNOSIS — Z98.890 OTHER SPECIFIED POSTPROCEDURAL STATES: Chronic | ICD-10-CM

## 2023-12-07 PROCEDURE — 96365 THER/PROPH/DIAG IV INF INIT: CPT

## 2023-12-07 PROCEDURE — 96366 THER/PROPH/DIAG IV INF ADDON: CPT

## 2023-12-07 PROCEDURE — 96372 THER/PROPH/DIAG INJ SC/IM: CPT

## 2023-12-07 RX ORDER — PREGABALIN 225 MG/1
1000 CAPSULE ORAL ONCE
Refills: 0 | Status: COMPLETED | OUTPATIENT
Start: 2023-12-07 | End: 2023-12-07

## 2023-12-07 RX ORDER — IRON SUCROSE 20 MG/ML
300 INJECTION, SOLUTION INTRAVENOUS ONCE
Refills: 0 | Status: COMPLETED | OUTPATIENT
Start: 2023-12-07 | End: 2023-12-07

## 2023-12-07 RX ADMIN — IRON SUCROSE 176.67 MILLIGRAM(S): 20 INJECTION, SOLUTION INTRAVENOUS at 11:43

## 2023-12-07 RX ADMIN — IRON SUCROSE 300 MILLIGRAM(S): 20 INJECTION, SOLUTION INTRAVENOUS at 13:15

## 2023-12-07 RX ADMIN — PREGABALIN 1000 MICROGRAM(S): 225 CAPSULE ORAL at 12:21

## 2023-12-07 NOTE — DISCHARGE INSTRUCTIONS: GENERAL THERAPY - NSAPPTSFOLLOWUP_HEME_A_AMB
[facility]... [facility]...

## 2023-12-07 NOTE — DISCHARGE INSTRUCTIONS: GENERAL THERAPY - NSFACILITYCONTAFTRHOUR_HEME_A_AMB
[facility] ([phone]) [facility] ([phone])

## 2023-12-11 DIAGNOSIS — D50.9 IRON DEFICIENCY ANEMIA, UNSPECIFIED: ICD-10-CM

## 2023-12-14 ENCOUNTER — APPOINTMENT (OUTPATIENT)
Dept: INFUSION THERAPY | Facility: CLINIC | Age: 78
End: 2023-12-14

## 2023-12-14 ENCOUNTER — OUTPATIENT (OUTPATIENT)
Dept: OUTPATIENT SERVICES | Facility: HOSPITAL | Age: 78
LOS: 1 days | End: 2023-12-14
Payer: MEDICARE

## 2023-12-14 VITALS
DIASTOLIC BLOOD PRESSURE: 81 MMHG | HEIGHT: 72 IN | WEIGHT: 190.04 LBS | SYSTOLIC BLOOD PRESSURE: 132 MMHG | RESPIRATION RATE: 18 BRPM | OXYGEN SATURATION: 100 % | TEMPERATURE: 98 F | HEART RATE: 78 BPM

## 2023-12-14 VITALS
HEART RATE: 78 BPM | OXYGEN SATURATION: 99 % | SYSTOLIC BLOOD PRESSURE: 121 MMHG | TEMPERATURE: 98 F | RESPIRATION RATE: 16 BRPM | DIASTOLIC BLOOD PRESSURE: 72 MMHG

## 2023-12-14 DIAGNOSIS — Z98.890 OTHER SPECIFIED POSTPROCEDURAL STATES: Chronic | ICD-10-CM

## 2023-12-14 DIAGNOSIS — D50.9 IRON DEFICIENCY ANEMIA, UNSPECIFIED: ICD-10-CM

## 2023-12-14 PROCEDURE — 96365 THER/PROPH/DIAG IV INF INIT: CPT

## 2023-12-14 PROCEDURE — 96366 THER/PROPH/DIAG IV INF ADDON: CPT

## 2023-12-14 RX ORDER — IRON SUCROSE 20 MG/ML
300 INJECTION, SOLUTION INTRAVENOUS ONCE
Refills: 0 | Status: COMPLETED | OUTPATIENT
Start: 2023-12-14 | End: 2023-12-14

## 2023-12-14 RX ADMIN — IRON SUCROSE 300 MILLIGRAM(S): 20 INJECTION, SOLUTION INTRAVENOUS at 14:20

## 2023-12-14 RX ADMIN — IRON SUCROSE 176.67 MILLIGRAM(S): 20 INJECTION, SOLUTION INTRAVENOUS at 12:45

## 2023-12-14 NOTE — DISCHARGE INSTRUCTIONS: GENERAL THERAPY - NSFACILITYCONTAFTRHOUR_HEME_A_AMB
[facility]fusion [facility] ([phone]) [facility] ([phone])

## 2024-01-12 ENCOUNTER — OUTPATIENT (OUTPATIENT)
Dept: OUTPATIENT SERVICES | Facility: HOSPITAL | Age: 79
LOS: 1 days | End: 2024-01-12
Payer: MEDICARE

## 2024-01-12 ENCOUNTER — APPOINTMENT (OUTPATIENT)
Dept: INFUSION THERAPY | Facility: CLINIC | Age: 79
End: 2024-01-12

## 2024-01-12 VITALS
HEIGHT: 72 IN | TEMPERATURE: 98 F | SYSTOLIC BLOOD PRESSURE: 118 MMHG | HEART RATE: 74 BPM | WEIGHT: 190.92 LBS | OXYGEN SATURATION: 98 % | RESPIRATION RATE: 17 BRPM | DIASTOLIC BLOOD PRESSURE: 68 MMHG

## 2024-01-12 DIAGNOSIS — Z98.890 OTHER SPECIFIED POSTPROCEDURAL STATES: Chronic | ICD-10-CM

## 2024-01-12 DIAGNOSIS — D50.9 IRON DEFICIENCY ANEMIA, UNSPECIFIED: ICD-10-CM

## 2024-01-12 PROCEDURE — 96372 THER/PROPH/DIAG INJ SC/IM: CPT

## 2024-01-12 RX ADMIN — PREGABALIN 1000 MICROGRAM(S): 225 CAPSULE ORAL at 14:59

## 2024-01-12 NOTE — DISCHARGE INSTRUCTIONS: GENERAL THERAPY - NSFACILITYCONTAFTRHOUR_HEME_A_AMB
[facility] ([phone]) [facility] ([phone])

## 2024-01-12 NOTE — DISCHARGE INSTRUCTIONS: GENERAL THERAPY - NSAPPTSFOLLOWUP_HEME_A_AMB
[facility]... [facility] Out[facility]fusion [facility]...

## 2024-02-01 ENCOUNTER — LABORATORY RESULT (OUTPATIENT)
Age: 79
End: 2024-02-01

## 2024-02-01 ENCOUNTER — APPOINTMENT (OUTPATIENT)
Dept: INFUSION THERAPY | Facility: CLINIC | Age: 79
End: 2024-02-01

## 2024-02-01 ENCOUNTER — APPOINTMENT (OUTPATIENT)
Dept: HEMATOLOGY ONCOLOGY | Facility: CLINIC | Age: 79
End: 2024-02-01
Payer: MEDICARE

## 2024-02-01 ENCOUNTER — OUTPATIENT (OUTPATIENT)
Dept: OUTPATIENT SERVICES | Facility: HOSPITAL | Age: 79
LOS: 1 days | End: 2024-02-01
Payer: MEDICARE

## 2024-02-01 VITALS
RESPIRATION RATE: 16 BRPM | SYSTOLIC BLOOD PRESSURE: 138 MMHG | WEIGHT: 184.97 LBS | HEIGHT: 72 IN | HEART RATE: 74 BPM | TEMPERATURE: 97 F | DIASTOLIC BLOOD PRESSURE: 78 MMHG | OXYGEN SATURATION: 99 %

## 2024-02-01 VITALS
HEIGHT: 72 IN | HEART RATE: 74 BPM | TEMPERATURE: 96.9 F | WEIGHT: 185 LBS | RESPIRATION RATE: 18 BRPM | SYSTOLIC BLOOD PRESSURE: 138 MMHG | OXYGEN SATURATION: 99 % | DIASTOLIC BLOOD PRESSURE: 78 MMHG | BODY MASS INDEX: 25.06 KG/M2

## 2024-02-01 DIAGNOSIS — D50.9 IRON DEFICIENCY ANEMIA, UNSPECIFIED: ICD-10-CM

## 2024-02-01 LAB
FERRITIN SERPL-MCNC: 118 NG/ML
IRON SATN MFR SERPL: 17 %
IRON SERPL-MCNC: 51 UG/DL
TIBC SERPL-MCNC: 297 UG/DL
UIBC SERPL-MCNC: 246 UG/DL

## 2024-02-01 PROCEDURE — 96372 THER/PROPH/DIAG INJ SC/IM: CPT

## 2024-02-01 PROCEDURE — 99213 OFFICE O/P EST LOW 20 MIN: CPT | Mod: 25

## 2024-02-01 PROCEDURE — 36415 COLL VENOUS BLD VENIPUNCTURE: CPT

## 2024-02-01 RX ORDER — CHLORHEXIDINE GLUCONATE 4 %
325 (65 FE) LIQUID (ML) TOPICAL
Qty: 30 | Refills: 5 | Status: ACTIVE | COMMUNITY
Start: 2022-12-05 | End: 1900-01-01

## 2024-02-01 RX ORDER — PANTOPRAZOLE 40 MG/1
40 TABLET, DELAYED RELEASE ORAL
Qty: 30 | Refills: 3 | Status: DISCONTINUED | COMMUNITY
Start: 2022-12-16 | End: 2024-02-01

## 2024-02-01 RX ORDER — PREGABALIN 225 MG/1
1000 CAPSULE ORAL ONCE
Refills: 0 | Status: COMPLETED | OUTPATIENT
Start: 2024-02-01 | End: 2024-02-01

## 2024-02-01 RX ORDER — PREGABALIN 225 MG/1
1000 CAPSULE ORAL ONCE
Refills: 0 | Status: DISCONTINUED | OUTPATIENT
Start: 2024-02-01 | End: 2024-02-01

## 2024-02-01 RX ADMIN — PREGABALIN 1000 MICROGRAM(S): 225 CAPSULE ORAL at 16:21

## 2024-02-02 ENCOUNTER — NON-APPOINTMENT (OUTPATIENT)
Age: 79
End: 2024-02-02

## 2024-02-02 LAB
FOLATE SERPL-MCNC: 7.7 NG/ML
VIT B12 SERPL-MCNC: 586 PG/ML

## 2024-02-04 NOTE — PHYSICAL EXAM
[Restricted in physically strenuous activity but ambulatory and able to carry out work of a light or sedentary nature] : Status 1- Restricted in physically strenuous activity but ambulatory and able to carry out work of a light or sedentary nature, e.g., light house work, office work [Normal] : pharynx is unremarkable, moist mucus membrane, no oral lesions [de-identified] : in no acute distress [de-identified] : supple [de-identified] : normal RR, breathing pattern [de-identified] : normal HR [de-identified] : giant right sided inguinal hernia  [de-identified] : pale

## 2024-02-04 NOTE — ASSESSMENT
[FreeTextEntry1] : 77 yo M here for follow up of severe combined iron and B12 deficiency anemia. Was also found to have "at least intramucosal adenocarcinoma" in a sigmoid colon polyp on colonoscopy 10/31/23. Lost to follow up with surgeon. CT c/a/p 2/2023 negative for metastatic disease and did not demonstrate malignancy. CEA was also normal.  Plan # iron deficiency anemia --s/p venofer 300mg x2 ( 12/7/23, 12/14/23), tolerated tx.  --reviewed lab, Iron/TIBC and ferritin are normal, Hgb 10.3 stable --continue PO iron supplementation ferrous sulfate 325 mg po daily or EOD, tolerating PO well   # h/o B12 deficiency/ low folate level --[id number], folate 7.7 today --continue B12 PO/folic acid 1mg daily and B12 shot monthly, will give B12 1000mcg IM today  # colon polyp positive for adenocarcinoma --he had a colonoscopy on 10/31/22 which showed a sigmoid colon polyp s/p polypectomy -pathology report showed adenomatous polyp with at least intramucosal adenocarcinoma and foci suspicious for submucosal invasion --CT c/a/p negative for metastatic disease and CEA normal 2/2023 --pt met with a surgeon, Dr Jett, who according to the patient recommended resection. patient is reluctant to consider surgery and has shared this with us multiple times. Emphasized to patient that this appears to be early stage disease based on presently available information; may be very curable. Emphasized need for follow up with colorectal surgeon to discuss options; offered second opinion. He emphatically declined at previous visit and again today. He says he intends to see Dr Keith about the malignant colon polyp. I again recommended that he see a colorectal surgeon. Discussed risks of delays in care, tumor progression, metastasis, risk of death from cancer if not treated. I believe the patient understands these risks but is making a decision on his own terms to delay care. --still has not seen surgeon or Dr Keith yet.  --he does not believe he has cancer and does not plan to follow up with this at this time.  --denies any new symptoms at this visit, recommend PCP follow up for regular check up.   plan discussed with Dr. Christopher  RTC 4-5 months labs- CBC, B12, folate, ferritin, iron/TIBC monthly B12 1000mcg by IM

## 2024-02-04 NOTE — REVIEW OF SYSTEMS
[Fever] : no fever [Chills] : no chills [Night Sweats] : no night sweats [Fatigue] : fatigue [Recent Change In Weight] : ~T recent weight change [Abdominal Pain] : no abdominal pain [Vomiting] : no vomiting [Constipation] : no constipation [Joint Pain] : joint pain [Joint Stiffness] : no joint stiffness [Muscle Pain] : no muscle pain [Muscle Weakness] : no muscle weakness [Negative] : Allergic/Immunologic [FreeTextEntry2] : weight loss [FreeTextEntry9] : shoulder  [FreeTextEntry7] : large inguinal hernia, stable as per pt

## 2024-02-04 NOTE — HISTORY OF PRESENT ILLNESS
[de-identified] : Mr. Villanueva is a 78 year old male here for follow up of combined B12 and iron deficiency anemia.   He had a 9/2022 admission to [facility] after a mechanical fall. Labs significant for severe anemia with Hb of 5.8 s/p 2-3 units of PRBC. Iron studies c/w with severe iron deficiency with Tsat 6% and ferritin of 40. He was treated with IV iron in the hospital. Discharged on oral iron which he takes once a day and PPI. He has a history of hernia repair and subtotal gastrectomy (due to perforated gastric ulcer 2/2 H pylori, 2018 at [place]), was treated for H pylori. He underwent a colonoscopy on 10/31/22 with Dr. Tafoya, had a polyp removed which he reports was positive for cancer.  At the initial visit we did not have records, but these later became available (listed below). He was seeing Dr. Jett (Surgeon) for his upcoming inguinal hernia repair, which his currently on hold due to severe WALLACE. He was told by Dr. Jett that he would require a colon resection for his cancer.   CT 9/2022 at [facility] - giant inguinal hernia - no findings for malignancy  Colonoscopy 10/31/22:  - Patent end-to end ileocolonic anastomosis, characterized by healthy appearing mucosa.  - Diverticulosis in the sigmoid colon  - One 30 mm polyp in the sigmoid colon s/p polypectomy.  - Non-bleeding external and internal hemorrhoids.   Pathology 10/31/22: sigmoid colon polyp s/p polypectomy,  at least intramucosal adenocarcinoma and foci suspicious for submucosal invasion  Received IV Iron (venofer) 3/4 300 mg doses - starting 12/2022 through 2/2023 B12 deficiency - repleted with weekly B12 injections - starting 12/2022  PMH: perforated ulcer s/p gastrectomy (2019), H.pylori s/p treatment, PE (provoked in rehab post surgery). Right inguinal hernia.  PSH: Hernia repair, gastrectomy  FH: No family history of cancer  SH: Former smoker, quit 20 year ago. Used to smoke 1 pack per week x 15 years. Denies any alcohol or drug use. Lives alone in [facility]  2/8/23 CT Chest: 1. Large right inguinal hernia containing nonobstructed loops of small bowel and a large cystic structure; possibly a massive hydrocele. 2.  Moderate right hydronephrosis. Distal right ureter extrinsically compressed within the superior aspect of the right inguinal hernia. [de-identified] : here for follow up  he has no new symptoms since last visit,  weight stable, eating ok.  taking B12/Folic acid /Iron PO daily or EOD.  has not seen any PCP or colorectal surgeon  otherwise he feels the same, denies new sites of pain, blood in urine or stool, nausea, vomiting, diarrhea, constipation, fever, SOB, or palpitations

## 2024-02-29 ENCOUNTER — OUTPATIENT (OUTPATIENT)
Dept: OUTPATIENT SERVICES | Facility: HOSPITAL | Age: 79
LOS: 1 days | End: 2024-02-29
Payer: MEDICARE

## 2024-02-29 ENCOUNTER — APPOINTMENT (OUTPATIENT)
Dept: INFUSION THERAPY | Facility: CLINIC | Age: 79
End: 2024-02-29

## 2024-02-29 VITALS
OXYGEN SATURATION: 99 % | RESPIRATION RATE: 16 BRPM | HEIGHT: 72 IN | DIASTOLIC BLOOD PRESSURE: 60 MMHG | SYSTOLIC BLOOD PRESSURE: 156 MMHG | HEART RATE: 60 BPM | WEIGHT: 190.04 LBS | TEMPERATURE: 98 F

## 2024-02-29 DIAGNOSIS — D50.9 IRON DEFICIENCY ANEMIA, UNSPECIFIED: ICD-10-CM

## 2024-02-29 DIAGNOSIS — Z98.890 OTHER SPECIFIED POSTPROCEDURAL STATES: Chronic | ICD-10-CM

## 2024-02-29 PROCEDURE — 96372 THER/PROPH/DIAG INJ SC/IM: CPT

## 2024-02-29 RX ORDER — PREGABALIN 225 MG/1
1000 CAPSULE ORAL ONCE
Refills: 0 | Status: COMPLETED | OUTPATIENT
Start: 2024-02-29 | End: 2024-02-29

## 2024-02-29 RX ADMIN — PREGABALIN 1000 MICROGRAM(S): 225 CAPSULE ORAL at 11:39

## 2024-03-28 ENCOUNTER — OUTPATIENT (OUTPATIENT)
Dept: OUTPATIENT SERVICES | Facility: HOSPITAL | Age: 79
LOS: 1 days | End: 2024-03-28
Payer: MEDICARE

## 2024-03-28 ENCOUNTER — APPOINTMENT (OUTPATIENT)
Dept: INFUSION THERAPY | Facility: CLINIC | Age: 79
End: 2024-03-28

## 2024-03-28 VITALS
WEIGHT: 190.04 LBS | SYSTOLIC BLOOD PRESSURE: 148 MMHG | TEMPERATURE: 99 F | HEIGHT: 72 IN | RESPIRATION RATE: 16 BRPM | HEART RATE: 88 BPM | DIASTOLIC BLOOD PRESSURE: 79 MMHG | OXYGEN SATURATION: 99 %

## 2024-03-28 DIAGNOSIS — Z98.890 OTHER SPECIFIED POSTPROCEDURAL STATES: Chronic | ICD-10-CM

## 2024-03-28 DIAGNOSIS — D50.9 IRON DEFICIENCY ANEMIA, UNSPECIFIED: ICD-10-CM

## 2024-03-28 PROCEDURE — 96372 THER/PROPH/DIAG INJ SC/IM: CPT

## 2024-03-28 RX ORDER — PREGABALIN 225 MG/1
1000 CAPSULE ORAL ONCE
Refills: 0 | Status: COMPLETED | OUTPATIENT
Start: 2024-03-28 | End: 2024-03-28

## 2024-03-28 RX ADMIN — PREGABALIN 1000 MICROGRAM(S): 225 CAPSULE ORAL at 12:13

## 2024-04-25 ENCOUNTER — APPOINTMENT (OUTPATIENT)
Dept: INFUSION THERAPY | Facility: CLINIC | Age: 79
End: 2024-04-25

## 2024-04-25 ENCOUNTER — OUTPATIENT (OUTPATIENT)
Dept: OUTPATIENT SERVICES | Facility: HOSPITAL | Age: 79
LOS: 1 days | End: 2024-04-25
Payer: MEDICARE

## 2024-04-25 VITALS
TEMPERATURE: 97 F | RESPIRATION RATE: 18 BRPM | HEART RATE: 82 BPM | WEIGHT: 190.04 LBS | DIASTOLIC BLOOD PRESSURE: 71 MMHG | SYSTOLIC BLOOD PRESSURE: 147 MMHG | HEIGHT: 72 IN | OXYGEN SATURATION: 98 %

## 2024-04-25 DIAGNOSIS — Z98.890 OTHER SPECIFIED POSTPROCEDURAL STATES: Chronic | ICD-10-CM

## 2024-04-25 DIAGNOSIS — D50.9 IRON DEFICIENCY ANEMIA, UNSPECIFIED: ICD-10-CM

## 2024-04-25 PROCEDURE — 96372 THER/PROPH/DIAG INJ SC/IM: CPT

## 2024-04-25 RX ORDER — PREGABALIN 225 MG/1
1000 CAPSULE ORAL ONCE
Refills: 0 | Status: COMPLETED | OUTPATIENT
Start: 2024-04-25 | End: 2024-04-25

## 2024-04-25 RX ADMIN — PREGABALIN 1000 MICROGRAM(S): 225 CAPSULE ORAL at 16:04

## 2024-05-23 ENCOUNTER — APPOINTMENT (OUTPATIENT)
Dept: INFUSION THERAPY | Facility: CLINIC | Age: 79
End: 2024-05-23

## 2024-05-23 ENCOUNTER — OUTPATIENT (OUTPATIENT)
Dept: OUTPATIENT SERVICES | Facility: HOSPITAL | Age: 79
LOS: 1 days | End: 2024-05-23
Payer: MEDICARE

## 2024-05-23 VITALS
HEART RATE: 75 BPM | OXYGEN SATURATION: 97 % | RESPIRATION RATE: 18 BRPM | TEMPERATURE: 99 F | WEIGHT: 192.02 LBS | SYSTOLIC BLOOD PRESSURE: 154 MMHG | HEIGHT: 72 IN | DIASTOLIC BLOOD PRESSURE: 82 MMHG

## 2024-05-23 DIAGNOSIS — D50.9 IRON DEFICIENCY ANEMIA, UNSPECIFIED: ICD-10-CM

## 2024-05-23 DIAGNOSIS — Z98.890 OTHER SPECIFIED POSTPROCEDURAL STATES: Chronic | ICD-10-CM

## 2024-05-23 PROCEDURE — 96372 THER/PROPH/DIAG INJ SC/IM: CPT

## 2024-05-23 RX ADMIN — PREGABALIN 1000 MICROGRAM(S): 225 CAPSULE ORAL at 15:44

## 2024-06-13 ENCOUNTER — APPOINTMENT (OUTPATIENT)
Dept: INFUSION THERAPY | Facility: CLINIC | Age: 79
End: 2024-06-13

## 2024-06-13 ENCOUNTER — APPOINTMENT (OUTPATIENT)
Dept: HEMATOLOGY ONCOLOGY | Facility: CLINIC | Age: 79
End: 2024-06-13
Payer: MEDICARE

## 2024-06-13 ENCOUNTER — OUTPATIENT (OUTPATIENT)
Dept: OUTPATIENT SERVICES | Facility: HOSPITAL | Age: 79
LOS: 1 days | End: 2024-06-13
Payer: MEDICARE

## 2024-06-13 VITALS
HEART RATE: 70 BPM | RESPIRATION RATE: 18 BRPM | TEMPERATURE: 98.4 F | BODY MASS INDEX: 25.19 KG/M2 | SYSTOLIC BLOOD PRESSURE: 139 MMHG | HEIGHT: 72 IN | DIASTOLIC BLOOD PRESSURE: 76 MMHG | WEIGHT: 186 LBS | OXYGEN SATURATION: 97 %

## 2024-06-13 VITALS
RESPIRATION RATE: 18 BRPM | WEIGHT: 186.07 LBS | SYSTOLIC BLOOD PRESSURE: 139 MMHG | HEART RATE: 70 BPM | OXYGEN SATURATION: 97 % | DIASTOLIC BLOOD PRESSURE: 76 MMHG | TEMPERATURE: 98 F | HEIGHT: 72 IN

## 2024-06-13 DIAGNOSIS — Z98.890 OTHER SPECIFIED POSTPROCEDURAL STATES: Chronic | ICD-10-CM

## 2024-06-13 DIAGNOSIS — E53.8 DEFICIENCY OF OTHER SPECIFIED B GROUP VITAMINS: ICD-10-CM

## 2024-06-13 DIAGNOSIS — D50.9 IRON DEFICIENCY ANEMIA, UNSPECIFIED: ICD-10-CM

## 2024-06-13 LAB
FERRITIN SERPL-MCNC: 73 NG/ML
HCT VFR BLD CALC: 34.5 %
HGB BLD-MCNC: 11 G/DL
IRON SATN MFR SERPL: 14 %
IRON SERPL-MCNC: 48 UG/DL
LYMPHOCYTES # BLD AUTO: 0.7 K/UL
LYMPHOCYTES NFR BLD AUTO: 17 %
MAN DIFF?: NO
MCHC RBC-ENTMCNC: 26.2 PG
MCHC RBC-ENTMCNC: 31.9 GM/DL
MCV RBC AUTO: 82.1 FL
NEUTROPHILS # BLD AUTO: 3.3 K/UL
NEUTROPHILS NFR BLD AUTO: 73.3 %
PLATELET # BLD AUTO: 221 K/UL
RBC # BLD: 4.2 M/UL
RBC # FLD: 14.4 %
TIBC SERPL-MCNC: 339 UG/DL
UIBC SERPL-MCNC: 291 UG/DL
WBC # FLD AUTO: 4.4 K/UL

## 2024-06-13 PROCEDURE — 96372 THER/PROPH/DIAG INJ SC/IM: CPT

## 2024-06-13 PROCEDURE — 36415 COLL VENOUS BLD VENIPUNCTURE: CPT

## 2024-06-13 PROCEDURE — 99213 OFFICE O/P EST LOW 20 MIN: CPT | Mod: 25

## 2024-06-13 PROCEDURE — 96374 THER/PROPH/DIAG INJ IV PUSH: CPT

## 2024-06-13 RX ORDER — PREGABALIN 225 MG/1
1000 CAPSULE ORAL ONCE
Refills: 0 | Status: COMPLETED | OUTPATIENT
Start: 2024-06-13 | End: 2024-06-13

## 2024-06-13 RX ADMIN — PREGABALIN 1000 MICROGRAM(S): 225 CAPSULE ORAL at 15:36

## 2024-06-14 LAB
FOLATE SERPL-MCNC: 14.8 NG/ML
VIT B12 SERPL-MCNC: 587 PG/ML

## 2024-06-17 PROBLEM — D50.9 IRON DEFICIENCY ANEMIA, UNSPECIFIED IRON DEFICIENCY ANEMIA TYPE: Status: ACTIVE | Noted: 2022-10-10

## 2024-06-17 PROBLEM — E53.8 VITAMIN B12 DEFICIENCY: Status: ACTIVE | Noted: 2022-12-22

## 2024-06-17 RX ORDER — FOLIC ACID 1 MG/1
1 TABLET ORAL DAILY
Qty: 90 | Refills: 1 | Status: ACTIVE | COMMUNITY
Start: 2023-02-15 | End: 1900-01-01

## 2024-06-17 RX ORDER — CHLORHEXIDINE GLUCONATE 4 %
1000 LIQUID (ML) TOPICAL DAILY
Qty: 30 | Refills: 6 | Status: ACTIVE | COMMUNITY
Start: 2023-02-02 | End: 1900-01-01

## 2024-06-17 NOTE — HISTORY OF PRESENT ILLNESS
[de-identified] : Mr. Villanueva is a 78 year old male here for follow up of combined B12 and iron deficiency anemia.   He had a 9/2022 admission to [facility] after a mechanical fall. Labs significant for severe anemia with Hb of 5.8 s/p 2-3 units of PRBC. Iron studies c/w with severe iron deficiency with Tsat 6% and ferritin of 40. He was treated with IV iron in the hospital. Discharged on oral iron which he takes once a day and PPI. He has a history of hernia repair and subtotal gastrectomy (due to perforated gastric ulcer 2/2 H pylori, 2018 at [place]), was treated for H pylori. He underwent a colonoscopy on 10/31/22 with Dr. Tafoya, had a polyp removed which he reports was positive for cancer.  At the initial visit we did not have records, but these later became available (listed below). He was seeing Dr. Jett (Surgeon) for his upcoming inguinal hernia repair, which his currently on hold due to severe WALLACE. He was told by Dr. Jett that he would require a colon resection for his cancer.   CT 9/2022 at [facility] - giant inguinal hernia - no findings for malignancy  Colonoscopy 10/31/22:  - Patent end-to end ileocolonic anastomosis, characterized by healthy appearing mucosa.  - Diverticulosis in the sigmoid colon  - One 30 mm polyp in the sigmoid colon s/p polypectomy.  - Non-bleeding external and internal hemorrhoids.   Pathology 10/31/22: sigmoid colon polyp s/p polypectomy,  at least intramucosal adenocarcinoma and foci suspicious for submucosal invasion  Received IV Iron (venofer) 3/4 300 mg doses - starting 12/2022 through 2/2023 B12 deficiency - repleted with weekly B12 injections - starting 12/2022  PMH: perforated ulcer s/p gastrectomy (2019), H.pylori s/p treatment, PE (provoked in rehab post surgery). Right inguinal hernia.  PSH: Hernia repair, gastrectomy  FH: No family history of cancer  SH: Former smoker, quit 20 year ago. Used to smoke 1 pack per week x 15 years. Denies any alcohol or drug use. Lives alone in [facility]  2/8/23 CT Chest: 1. Large right inguinal hernia containing nonobstructed loops of small bowel and a large cystic structure; possibly a massive hydrocele. 2.  Moderate right hydronephrosis. Distal right ureter extrinsically compressed within the superior aspect of the right inguinal hernia. [de-identified] : here for follow up  he has not seen any PCP or colorectal surgeon  taking B12/Folic acid /Iron PO daily or EOD he feels the same w/o new symptoms since last visit, weight stable. BM is ok. No blood in stool or urine.  denies any sites of pain, nausea, vomiting, diarrhea, constipation, fever, SOB, or palpitations he feels like his hernia has slightly increased but denies any pain.

## 2024-06-17 NOTE — PHYSICAL EXAM
[Restricted in physically strenuous activity but ambulatory and able to carry out work of a light or sedentary nature] : Status 1- Restricted in physically strenuous activity but ambulatory and able to carry out work of a light or sedentary nature, e.g., light house work, office work [de-identified] : pale [Normal] : normal appearance, no rash, nodules, vesicles, ulcers, erythema [de-identified] : in no acute distress [de-identified] : supple [de-identified] : normal RR, breathing pattern [de-identified] : normal HR [de-identified] : giant right sided inguinal hernia,

## 2024-06-17 NOTE — ASSESSMENT
[FreeTextEntry1] : 77 yo M here for follow up of severe combined iron and B12 deficiency anemia. Was also found to have "at least intramucosal adenocarcinoma" in a sigmoid colon polyp on colonoscopy 10/31/23. Lost to follow up with surgeon. CT c/a/p 2/2023 negative for metastatic disease and did not demonstrate malignancy. CEA was also normal.  Plan # iron deficiency anemia --s/p venofer 300mg x2 (12/7/23, 12/14/23), tolerated tx.  --reviewed lab, ferritin normal, transferrin sat 14 Hgb 11, overall looks good.  --continue PO iron supplementation ferrous sulfate 325 mg po daily or EOD, tolerating PO well, rx renewed today  # h/o B12 deficiency/ low folate level --B12 587,folate 14.8 today --continue B12 PO/folic acid 1mg daily and B12 shot monthly, will give B12 1000mcg IM today  # colon polyp positive for adenocarcinoma --he had a colonoscopy on 10/31/22 which showed a sigmoid colon polyp s/p polypectomy -pathology report showed adenomatous polyp with at least intramucosal adenocarcinoma and foci suspicious for submucosal invasion --CT c/a/p negative for metastatic disease and CEA normal 2/2023 --pt met with a surgeon, Dr Jett, who according to the patient recommended resection. patient is reluctant to consider surgery and has shared this with us multiple times. Emphasized to patient that this appears to be early stage disease based on presently available information; may be very curable. Emphasized need for follow up with colorectal surgeon to discuss options; offered second opinion. He emphatically declined at previous visit and again today. He says he intends to see Dr Keith about the malignant colon polyp. I again recommended that he see a colorectal surgeon. Discussed risks of delays in care, tumor progression, metastasis, risk of death from cancer if not treated. I believe the patient understands these risks but is making a decision on his own terms to delay care. --still has not seen surgeon or Dr Keith yet.  --he does not believe he has cancer and does not plan to follow up with this at this time. Continue to refuse any evaluation of colon cancer --denies any new symptoms at this visit, refused seeing even PCP at this time because he says he feels good.   plan discussed with Dr. Christopher  RTC 6 months labs- CBC, B12, folate, ferritin, iron/TIBC monthly B12 1000mcg by IM

## 2024-06-20 ENCOUNTER — NON-APPOINTMENT (OUTPATIENT)
Age: 79
End: 2024-06-20

## 2024-07-09 RX ORDER — CYANOCOBALAMIN (VITAMIN B-12) 1000 MCG
1000 TABLET, EXTENDED RELEASE ORAL ONCE
Refills: 0 | Status: COMPLETED | OUTPATIENT
Start: 2024-07-10 | End: 2024-07-10

## 2024-07-10 ENCOUNTER — APPOINTMENT (OUTPATIENT)
Dept: INFUSION THERAPY | Facility: CLINIC | Age: 79
End: 2024-07-10

## 2024-07-10 ENCOUNTER — OUTPATIENT (OUTPATIENT)
Dept: OUTPATIENT SERVICES | Facility: HOSPITAL | Age: 79
LOS: 1 days | End: 2024-07-10
Payer: MEDICARE

## 2024-07-10 VITALS
DIASTOLIC BLOOD PRESSURE: 72 MMHG | RESPIRATION RATE: 18 BRPM | SYSTOLIC BLOOD PRESSURE: 123 MMHG | TEMPERATURE: 98 F | WEIGHT: 184.09 LBS | HEIGHT: 72 IN | HEART RATE: 59 BPM | OXYGEN SATURATION: 99 %

## 2024-07-10 DIAGNOSIS — Z98.890 OTHER SPECIFIED POSTPROCEDURAL STATES: Chronic | ICD-10-CM

## 2024-07-10 DIAGNOSIS — D50.9 IRON DEFICIENCY ANEMIA, UNSPECIFIED: ICD-10-CM

## 2024-07-10 PROCEDURE — 96372 THER/PROPH/DIAG INJ SC/IM: CPT

## 2024-07-10 RX ADMIN — Medication 1000 MICROGRAM(S): at 16:10

## 2024-08-13 ENCOUNTER — APPOINTMENT (OUTPATIENT)
Dept: INFUSION THERAPY | Facility: CLINIC | Age: 79
End: 2024-08-13

## 2024-09-06 NOTE — PATIENT PROFILE ADULT - NSPROGENBLOODRESTRICT_GEN_A_NUR
Anne Rodriguez comes in today for a comprehensive follow up.      Ms. Rodriguez comes in today for comprehensive follow-up.  Blood pressure at home remains in the 130s/80s.  She continues on her diuretic therapy for blood pressure management.  She continues to struggle with osteoarthritis of the knees.  She had cortisone injections at the end of last year which did help significantly for several months but pain has started to come back.  She is not interested in further cortisone injection stating that she wants something more permanent yet not surgery.  She has been using castor oil which seems to be helpful for her symptomatically.  She does have an orthopedist that she can contact.  She does have some asymptomatic varicosities on her legs, no pain or swelling associated with this.  She has not followed up with endocrine for her parathyroid levels as recommended.  She is amenable to updating comprehensive labs and states that she does not need refills at this time.  She is trying to increase her fluid intake.  She denies any headaches, dizziness, chest pain or palpitations, shortness of breath nor cough, nausea, vomiting, nor changes in bowel nor bladder habits.        Review of Systems   Respiratory:  Negative for cough, chest tightness, shortness of breath and wheezing.    Cardiovascular:  Negative for chest pain, palpitations and leg swelling.   Gastrointestinal:  Negative for abdominal distention, abdominal pain, constipation, diarrhea and nausea.   Musculoskeletal:  Positive for arthralgias. Negative for gait problem.   Neurological:  Negative for dizziness, light-headedness and headaches.       Objective   Physical Exam  Constitutional:       Appearance: Normal appearance.   Cardiovascular:      Rate and Rhythm: Normal rate and regular rhythm.      Pulses: Normal pulses.      Heart sounds: Normal heart sounds. No murmur heard.     No gallop.   Pulmonary:      Effort: Pulmonary effort is normal. No respiratory  distress.      Breath sounds: Normal breath sounds. No wheezing, rhonchi or rales.   Abdominal:      General: There is no distension.      Palpations: Abdomen is soft.      Tenderness: There is no abdominal tenderness. There is no guarding or rebound.   Musculoskeletal:      Right lower leg: No edema.      Left lower leg: No edema.   Neurological:      Mental Status: She is alert.         Assessment/Plan   1.  Hypertension: Borderline control but reasonable.  Continue on diuretic therapy, update BMP.  Contact us if refills needed.  If levels start running higher, may need to consider addition of therapy such as amlodipine.  2.  Hyperparathyroidism: Update labs, if remains elevated will need referral back to endocrine.  She voices understanding.  3.  Osteoarthritis of the knees: Symptomatically managed with topical agents currently but I have encouraged her to reach back out with her orthopedist if needed for more definitive treatment options.    She will be due for her wellness visit towards the end of the year.  Have encouraged her to schedule.  She is to call with any questions in the interim.  Problem List Items Addressed This Visit    None       none

## 2024-09-13 ENCOUNTER — APPOINTMENT (OUTPATIENT)
Dept: INFUSION THERAPY | Facility: CLINIC | Age: 79
End: 2024-09-13

## 2024-09-13 ENCOUNTER — OUTPATIENT (OUTPATIENT)
Dept: OUTPATIENT SERVICES | Facility: HOSPITAL | Age: 79
LOS: 1 days | End: 2024-09-13
Payer: MEDICARE

## 2024-09-13 VITALS
SYSTOLIC BLOOD PRESSURE: 147 MMHG | HEART RATE: 60 BPM | RESPIRATION RATE: 18 BRPM | DIASTOLIC BLOOD PRESSURE: 72 MMHG | HEIGHT: 72 IN | TEMPERATURE: 98 F | OXYGEN SATURATION: 98 % | WEIGHT: 186.95 LBS

## 2024-09-13 DIAGNOSIS — D50.9 IRON DEFICIENCY ANEMIA, UNSPECIFIED: ICD-10-CM

## 2024-09-13 DIAGNOSIS — Z98.890 OTHER SPECIFIED POSTPROCEDURAL STATES: Chronic | ICD-10-CM

## 2024-09-13 PROCEDURE — 96372 THER/PROPH/DIAG INJ SC/IM: CPT

## 2024-09-13 RX ORDER — CYANOCOBALAMIN (VITAMIN B-12) 500MCG/0.1
1000 GEL (ML) NASAL ONCE
Refills: 0 | Status: COMPLETED | OUTPATIENT
Start: 2024-09-13 | End: 2024-09-13

## 2024-09-13 RX ADMIN — Medication 1000 MICROGRAM(S): at 13:30

## 2024-10-14 ENCOUNTER — APPOINTMENT (OUTPATIENT)
Dept: INFUSION THERAPY | Facility: CLINIC | Age: 79
End: 2024-10-14

## 2024-10-14 ENCOUNTER — OUTPATIENT (OUTPATIENT)
Dept: OUTPATIENT SERVICES | Facility: HOSPITAL | Age: 79
LOS: 1 days | End: 2024-10-14
Payer: MEDICARE

## 2024-10-14 VITALS
RESPIRATION RATE: 16 BRPM | HEART RATE: 65 BPM | OXYGEN SATURATION: 98 % | SYSTOLIC BLOOD PRESSURE: 150 MMHG | TEMPERATURE: 98 F | DIASTOLIC BLOOD PRESSURE: 70 MMHG

## 2024-10-14 DIAGNOSIS — D50.9 IRON DEFICIENCY ANEMIA, UNSPECIFIED: ICD-10-CM

## 2024-10-14 DIAGNOSIS — Z98.890 OTHER SPECIFIED POSTPROCEDURAL STATES: Chronic | ICD-10-CM

## 2024-10-14 RX ORDER — CYANOCOBALAMIN (VITAMIN B-12) 1000MCG/ML
1000 VIAL (ML) INJECTION ONCE
Refills: 0 | Status: COMPLETED | OUTPATIENT
Start: 2024-10-14 | End: 2024-10-14

## 2024-10-14 RX ADMIN — Medication 1000 MICROGRAM(S): at 15:10

## 2024-11-13 ENCOUNTER — OUTPATIENT (OUTPATIENT)
Dept: OUTPATIENT SERVICES | Facility: HOSPITAL | Age: 79
LOS: 1 days | End: 2024-11-13
Payer: MEDICARE

## 2024-11-13 ENCOUNTER — APPOINTMENT (OUTPATIENT)
Dept: INFUSION THERAPY | Facility: CLINIC | Age: 79
End: 2024-11-13

## 2024-11-13 VITALS
OXYGEN SATURATION: 99 % | HEART RATE: 68 BPM | SYSTOLIC BLOOD PRESSURE: 133 MMHG | HEIGHT: 72 IN | RESPIRATION RATE: 18 BRPM | TEMPERATURE: 97 F | DIASTOLIC BLOOD PRESSURE: 83 MMHG | WEIGHT: 190.04 LBS

## 2024-11-13 DIAGNOSIS — D50.9 IRON DEFICIENCY ANEMIA, UNSPECIFIED: ICD-10-CM

## 2024-11-13 DIAGNOSIS — Z98.890 OTHER SPECIFIED POSTPROCEDURAL STATES: Chronic | ICD-10-CM

## 2024-11-13 PROCEDURE — 96372 THER/PROPH/DIAG INJ SC/IM: CPT

## 2024-11-13 RX ORDER — CYANOCOBALAMIN (VITAMIN B-12) 1000MCG/15
1000 LIQUID (ML) ORAL ONCE
Refills: 0 | Status: COMPLETED | OUTPATIENT
Start: 2024-11-13 | End: 2024-11-13

## 2024-11-13 RX ADMIN — Medication 1000 MICROGRAM(S): at 14:15

## 2024-11-20 PROCEDURE — 96372 THER/PROPH/DIAG INJ SC/IM: CPT

## 2024-12-12 ENCOUNTER — LABORATORY RESULT (OUTPATIENT)
Age: 79
End: 2024-12-12

## 2024-12-12 ENCOUNTER — APPOINTMENT (OUTPATIENT)
Dept: HEMATOLOGY ONCOLOGY | Facility: CLINIC | Age: 79
End: 2024-12-12
Payer: MEDICARE

## 2024-12-12 ENCOUNTER — OUTPATIENT (OUTPATIENT)
Dept: OUTPATIENT SERVICES | Facility: HOSPITAL | Age: 79
LOS: 1 days | End: 2024-12-12
Payer: MEDICARE

## 2024-12-12 ENCOUNTER — APPOINTMENT (OUTPATIENT)
Dept: INFUSION THERAPY | Facility: CLINIC | Age: 79
End: 2024-12-12

## 2024-12-12 VITALS
HEART RATE: 72 BPM | BODY MASS INDEX: 26.55 KG/M2 | HEIGHT: 72 IN | TEMPERATURE: 97.8 F | WEIGHT: 196 LBS | SYSTOLIC BLOOD PRESSURE: 157 MMHG | RESPIRATION RATE: 18 BRPM | OXYGEN SATURATION: 100 % | DIASTOLIC BLOOD PRESSURE: 81 MMHG

## 2024-12-12 VITALS
TEMPERATURE: 97 F | OXYGEN SATURATION: 100 % | SYSTOLIC BLOOD PRESSURE: 150 MMHG | HEIGHT: 72 IN | HEART RATE: 71 BPM | DIASTOLIC BLOOD PRESSURE: 90 MMHG | WEIGHT: 197.98 LBS | RESPIRATION RATE: 18 BRPM

## 2024-12-12 DIAGNOSIS — Z98.890 OTHER SPECIFIED POSTPROCEDURAL STATES: Chronic | ICD-10-CM

## 2024-12-12 DIAGNOSIS — E53.8 DEFICIENCY OF OTHER SPECIFIED B GROUP VITAMINS: ICD-10-CM

## 2024-12-12 DIAGNOSIS — D50.9 IRON DEFICIENCY ANEMIA, UNSPECIFIED: ICD-10-CM

## 2024-12-12 PROCEDURE — 99214 OFFICE O/P EST MOD 30 MIN: CPT | Mod: 25

## 2024-12-12 PROCEDURE — 36415 COLL VENOUS BLD VENIPUNCTURE: CPT

## 2024-12-12 PROCEDURE — 96372 THER/PROPH/DIAG INJ SC/IM: CPT

## 2024-12-12 RX ADMIN — Medication 1000 MICROGRAM(S): at 15:00

## 2024-12-13 ENCOUNTER — NON-APPOINTMENT (OUTPATIENT)
Age: 79
End: 2024-12-13

## 2024-12-13 LAB
BASOPHILS # BLD AUTO: 0.07 K/UL
BASOPHILS NFR BLD AUTO: 2 %
EOSINOPHIL # BLD AUTO: 0.42 K/UL
EOSINOPHIL NFR BLD AUTO: 12 %
FERRITIN SERPL-MCNC: 53 NG/ML
FOLATE SERPL-MCNC: 11 NG/ML
HCT VFR BLD CALC: 34.5 %
HGB BLD-MCNC: 10.9 G/DL
IRON SATN MFR SERPL: 21 %
IRON SERPL-MCNC: 75 UG/DL
LYMPHOCYTES # BLD AUTO: 0.39 K/UL
LYMPHOCYTES NFR BLD AUTO: 11 %
MAN DIFF?: NORMAL
MCHC RBC-ENTMCNC: 26.1 PG
MCHC RBC-ENTMCNC: 31.6 G/DL
MCV RBC AUTO: 82.7 FL
MONOCYTES # BLD AUTO: 0.28 K/UL
MONOCYTES NFR BLD AUTO: 8 %
NEUTROPHILS # BLD AUTO: 2.35 K/UL
NEUTROPHILS NFR BLD AUTO: 67 %
PLATELET # BLD AUTO: 240 K/UL
RBC # BLD: 4.17 M/UL
RBC # FLD: 13.2 %
TIBC SERPL-MCNC: 360 UG/DL
UIBC SERPL-MCNC: 285 UG/DL
VIT B12 SERPL-MCNC: 578 PG/ML
WBC # FLD AUTO: 3.51 K/UL

## 2025-01-09 ENCOUNTER — OUTPATIENT (OUTPATIENT)
Dept: OUTPATIENT SERVICES | Facility: HOSPITAL | Age: 80
LOS: 1 days | End: 2025-01-09
Payer: MEDICARE

## 2025-01-09 ENCOUNTER — APPOINTMENT (OUTPATIENT)
Dept: INFUSION THERAPY | Facility: CLINIC | Age: 80
End: 2025-01-09

## 2025-01-09 VITALS
TEMPERATURE: 97 F | RESPIRATION RATE: 16 BRPM | SYSTOLIC BLOOD PRESSURE: 150 MMHG | HEIGHT: 72 IN | HEART RATE: 78 BPM | DIASTOLIC BLOOD PRESSURE: 78 MMHG | WEIGHT: 197.09 LBS | OXYGEN SATURATION: 100 %

## 2025-01-09 DIAGNOSIS — D50.9 IRON DEFICIENCY ANEMIA, UNSPECIFIED: ICD-10-CM

## 2025-01-09 DIAGNOSIS — Z98.890 OTHER SPECIFIED POSTPROCEDURAL STATES: Chronic | ICD-10-CM

## 2025-01-09 PROCEDURE — 96372 THER/PROPH/DIAG INJ SC/IM: CPT

## 2025-01-09 RX ORDER — CYANOCOBALAMIN 1000 UG/ML
1000 INJECTION, SOLUTION INTRAMUSCULAR; SUBCUTANEOUS ONCE
Refills: 0 | Status: COMPLETED | OUTPATIENT
Start: 2025-01-09 | End: 2025-01-09

## 2025-01-09 RX ADMIN — CYANOCOBALAMIN 1000 MICROGRAM(S): 1000 INJECTION, SOLUTION INTRAMUSCULAR; SUBCUTANEOUS at 15:33

## 2025-02-07 ENCOUNTER — APPOINTMENT (OUTPATIENT)
Dept: INFUSION THERAPY | Facility: CLINIC | Age: 80
End: 2025-02-07

## 2025-02-07 ENCOUNTER — OUTPATIENT (OUTPATIENT)
Dept: OUTPATIENT SERVICES | Facility: HOSPITAL | Age: 80
LOS: 1 days | End: 2025-02-07
Payer: MEDICARE

## 2025-02-07 VITALS
WEIGHT: 201.94 LBS | DIASTOLIC BLOOD PRESSURE: 69 MMHG | HEIGHT: 72 IN | OXYGEN SATURATION: 99 % | SYSTOLIC BLOOD PRESSURE: 111 MMHG | RESPIRATION RATE: 18 BRPM | HEART RATE: 88 BPM | TEMPERATURE: 97 F

## 2025-02-07 DIAGNOSIS — D50.9 IRON DEFICIENCY ANEMIA, UNSPECIFIED: ICD-10-CM

## 2025-02-07 DIAGNOSIS — Z98.890 OTHER SPECIFIED POSTPROCEDURAL STATES: Chronic | ICD-10-CM

## 2025-02-07 PROCEDURE — 96372 THER/PROPH/DIAG INJ SC/IM: CPT

## 2025-02-07 RX ORDER — CYANOCOBALAMIN (VITAMIN B-12) 1000MCG/ML
1000 VIAL (ML) INJECTION ONCE
Refills: 0 | Status: COMPLETED | OUTPATIENT
Start: 2025-02-07 | End: 2025-02-07

## 2025-02-07 RX ADMIN — Medication 1000 MICROGRAM(S): at 17:11

## 2025-03-06 ENCOUNTER — APPOINTMENT (OUTPATIENT)
Dept: INFUSION THERAPY | Facility: CLINIC | Age: 80
End: 2025-03-06

## 2025-03-07 ENCOUNTER — APPOINTMENT (OUTPATIENT)
Dept: INFUSION THERAPY | Facility: CLINIC | Age: 80
End: 2025-03-07

## 2025-03-07 ENCOUNTER — OUTPATIENT (OUTPATIENT)
Dept: OUTPATIENT SERVICES | Facility: HOSPITAL | Age: 80
LOS: 1 days | End: 2025-03-07
Payer: MEDICARE

## 2025-03-07 VITALS
HEIGHT: 60 IN | DIASTOLIC BLOOD PRESSURE: 79 MMHG | RESPIRATION RATE: 17 BRPM | OXYGEN SATURATION: 100 % | WEIGHT: 207.9 LBS | TEMPERATURE: 98 F | SYSTOLIC BLOOD PRESSURE: 150 MMHG | HEART RATE: 82 BPM

## 2025-03-07 DIAGNOSIS — Z98.890 OTHER SPECIFIED POSTPROCEDURAL STATES: Chronic | ICD-10-CM

## 2025-03-07 DIAGNOSIS — D50.9 IRON DEFICIENCY ANEMIA, UNSPECIFIED: ICD-10-CM

## 2025-03-07 PROCEDURE — 96372 THER/PROPH/DIAG INJ SC/IM: CPT

## 2025-03-07 RX ORDER — CYANOCOBALAMIN 1000 UG/ML
1000 INJECTION INTRAMUSCULAR; SUBCUTANEOUS ONCE
Refills: 0 | Status: COMPLETED | OUTPATIENT
Start: 2025-03-07 | End: 2025-03-07

## 2025-03-07 RX ADMIN — CYANOCOBALAMIN 1000 MICROGRAM(S): 1000 INJECTION INTRAMUSCULAR; SUBCUTANEOUS at 16:53

## 2025-04-03 ENCOUNTER — OUTPATIENT (OUTPATIENT)
Dept: OUTPATIENT SERVICES | Facility: HOSPITAL | Age: 80
LOS: 1 days | End: 2025-04-03
Payer: MEDICARE

## 2025-04-03 ENCOUNTER — APPOINTMENT (OUTPATIENT)
Dept: INFUSION THERAPY | Facility: CLINIC | Age: 80
End: 2025-04-03

## 2025-04-03 VITALS
HEIGHT: 72 IN | WEIGHT: 210.1 LBS | SYSTOLIC BLOOD PRESSURE: 132 MMHG | RESPIRATION RATE: 16 BRPM | OXYGEN SATURATION: 96 % | HEART RATE: 66 BPM | DIASTOLIC BLOOD PRESSURE: 70 MMHG | TEMPERATURE: 98 F

## 2025-04-03 DIAGNOSIS — D50.9 IRON DEFICIENCY ANEMIA, UNSPECIFIED: ICD-10-CM

## 2025-04-03 DIAGNOSIS — Z98.890 OTHER SPECIFIED POSTPROCEDURAL STATES: Chronic | ICD-10-CM

## 2025-04-03 PROCEDURE — 96372 THER/PROPH/DIAG INJ SC/IM: CPT

## 2025-04-03 RX ORDER — CYANOCOBALAMIN 1000 UG/ML
1000 INJECTION INTRAMUSCULAR; SUBCUTANEOUS ONCE
Refills: 0 | Status: COMPLETED | OUTPATIENT
Start: 2025-04-03 | End: 2025-04-03

## 2025-04-03 RX ADMIN — CYANOCOBALAMIN 1000 MICROGRAM(S): 1000 INJECTION INTRAMUSCULAR; SUBCUTANEOUS at 14:38

## 2025-05-01 ENCOUNTER — APPOINTMENT (OUTPATIENT)
Dept: INFUSION THERAPY | Facility: CLINIC | Age: 80
End: 2025-05-01

## 2025-05-01 ENCOUNTER — OUTPATIENT (OUTPATIENT)
Dept: OUTPATIENT SERVICES | Facility: HOSPITAL | Age: 80
LOS: 1 days | End: 2025-05-01
Payer: MEDICARE

## 2025-05-01 VITALS
TEMPERATURE: 98 F | HEIGHT: 72 IN | RESPIRATION RATE: 18 BRPM | DIASTOLIC BLOOD PRESSURE: 69 MMHG | WEIGHT: 210.98 LBS | HEART RATE: 68 BPM | OXYGEN SATURATION: 99 % | SYSTOLIC BLOOD PRESSURE: 126 MMHG

## 2025-05-01 DIAGNOSIS — Z98.890 OTHER SPECIFIED POSTPROCEDURAL STATES: Chronic | ICD-10-CM

## 2025-05-01 DIAGNOSIS — D50.9 IRON DEFICIENCY ANEMIA, UNSPECIFIED: ICD-10-CM

## 2025-05-01 PROCEDURE — 96372 THER/PROPH/DIAG INJ SC/IM: CPT

## 2025-05-01 RX ORDER — CYANOCOBALAMIN 1000 UG/ML
1000 INJECTION INTRAMUSCULAR; SUBCUTANEOUS ONCE
Refills: 0 | Status: COMPLETED | OUTPATIENT
Start: 2025-05-01 | End: 2025-05-01

## 2025-05-01 RX ADMIN — CYANOCOBALAMIN 1000 MICROGRAM(S): 1000 INJECTION INTRAMUSCULAR; SUBCUTANEOUS at 14:53

## 2025-05-29 ENCOUNTER — APPOINTMENT (OUTPATIENT)
Dept: INFUSION THERAPY | Facility: CLINIC | Age: 80
End: 2025-05-29

## 2025-05-29 ENCOUNTER — OUTPATIENT (OUTPATIENT)
Dept: OUTPATIENT SERVICES | Facility: HOSPITAL | Age: 80
LOS: 1 days | End: 2025-05-29
Payer: MEDICARE

## 2025-05-29 VITALS
WEIGHT: 212.97 LBS | SYSTOLIC BLOOD PRESSURE: 143 MMHG | OXYGEN SATURATION: 99 % | RESPIRATION RATE: 18 BRPM | DIASTOLIC BLOOD PRESSURE: 79 MMHG | TEMPERATURE: 97 F | HEART RATE: 72 BPM | HEIGHT: 72 IN

## 2025-05-29 DIAGNOSIS — D50.9 IRON DEFICIENCY ANEMIA, UNSPECIFIED: ICD-10-CM

## 2025-05-29 DIAGNOSIS — Z98.890 OTHER SPECIFIED POSTPROCEDURAL STATES: Chronic | ICD-10-CM

## 2025-05-29 PROCEDURE — 96372 THER/PROPH/DIAG INJ SC/IM: CPT

## 2025-05-29 RX ORDER — CYANOCOBALAMIN 1000 UG/ML
1000 INJECTION INTRAMUSCULAR; SUBCUTANEOUS ONCE
Refills: 0 | Status: COMPLETED | OUTPATIENT
Start: 2025-05-29 | End: 2025-05-29

## 2025-05-29 RX ADMIN — CYANOCOBALAMIN 1000 MICROGRAM(S): 1000 INJECTION INTRAMUSCULAR; SUBCUTANEOUS at 14:35

## 2025-06-26 ENCOUNTER — APPOINTMENT (OUTPATIENT)
Dept: INFUSION THERAPY | Facility: CLINIC | Age: 80
End: 2025-06-26

## 2025-06-26 ENCOUNTER — OUTPATIENT (OUTPATIENT)
Dept: OUTPATIENT SERVICES | Facility: HOSPITAL | Age: 80
LOS: 1 days | End: 2025-06-26
Payer: MEDICARE

## 2025-06-26 ENCOUNTER — APPOINTMENT (OUTPATIENT)
Dept: HEMATOLOGY ONCOLOGY | Facility: CLINIC | Age: 80
End: 2025-06-26
Payer: MEDICARE

## 2025-06-26 VITALS
SYSTOLIC BLOOD PRESSURE: 130 MMHG | HEART RATE: 70 BPM | DIASTOLIC BLOOD PRESSURE: 65 MMHG | RESPIRATION RATE: 18 BRPM | TEMPERATURE: 97.6 F | BODY MASS INDEX: 27.9 KG/M2 | HEIGHT: 72 IN | WEIGHT: 206 LBS | OXYGEN SATURATION: 98 %

## 2025-06-26 VITALS
WEIGHT: 209 LBS | RESPIRATION RATE: 17 BRPM | SYSTOLIC BLOOD PRESSURE: 132 MMHG | TEMPERATURE: 98 F | DIASTOLIC BLOOD PRESSURE: 80 MMHG | HEART RATE: 68 BPM | HEIGHT: 72 IN | OXYGEN SATURATION: 98 %

## 2025-06-26 DIAGNOSIS — Z98.890 OTHER SPECIFIED POSTPROCEDURAL STATES: Chronic | ICD-10-CM

## 2025-06-26 DIAGNOSIS — D50.9 IRON DEFICIENCY ANEMIA, UNSPECIFIED: ICD-10-CM

## 2025-06-26 PROBLEM — Z01.818 PRE-OP EVALUATION: Status: RESOLVED | Noted: 2022-10-20 | Resolved: 2025-06-26

## 2025-06-26 LAB
FERRITIN SERPL-MCNC: 36 NG/ML
HCT VFR BLD CALC: 30.9 %
HGB BLD-MCNC: 9.9 G/DL
IRON SATN MFR SERPL: 13 %
IRON SERPL-MCNC: 51 UG/DL
LYMPHOCYTES # BLD AUTO: 0.6 K/UL
LYMPHOCYTES NFR BLD AUTO: 12.6 %
MAN DIFF?: NO
MCHC RBC-ENTMCNC: 25.1 PG
MCHC RBC-ENTMCNC: 32 G/DL
MCV RBC AUTO: 78.2 FL
NEUTROPHILS # BLD AUTO: 3.3 K/UL
NEUTROPHILS NFR BLD AUTO: 71.3 %
PLATELET # BLD AUTO: 223 K/UL
RBC # BLD: 3.95 M/UL
RBC # FLD: 14.8 %
TIBC SERPL-MCNC: 384 UG/DL
UIBC SERPL-MCNC: 333 UG/DL
WBC # FLD AUTO: 4.7 K/UL

## 2025-06-26 PROCEDURE — 99214 OFFICE O/P EST MOD 30 MIN: CPT | Mod: 25

## 2025-06-26 PROCEDURE — 36415 COLL VENOUS BLD VENIPUNCTURE: CPT

## 2025-06-26 PROCEDURE — 96372 THER/PROPH/DIAG INJ SC/IM: CPT

## 2025-06-26 RX ORDER — PANTOPRAZOLE 40 MG/1
40 TABLET, DELAYED RELEASE ORAL DAILY
Qty: 30 | Refills: 3 | Status: ACTIVE | COMMUNITY
Start: 2025-06-26 | End: 1900-01-01

## 2025-06-26 RX ORDER — CYANOCOBALAMIN 1000 UG/ML
1000 INJECTION INTRAMUSCULAR; SUBCUTANEOUS ONCE
Refills: 0 | Status: COMPLETED | OUTPATIENT
Start: 2025-06-26 | End: 2025-06-26

## 2025-06-26 RX ADMIN — CYANOCOBALAMIN 1000 MICROGRAM(S): 1000 INJECTION INTRAMUSCULAR; SUBCUTANEOUS at 14:56

## 2025-06-27 LAB
FOLATE SERPL-MCNC: 11 NG/ML
VIT B12 SERPL-MCNC: 515 PG/ML

## 2025-07-24 ENCOUNTER — APPOINTMENT (OUTPATIENT)
Dept: INFUSION THERAPY | Facility: CLINIC | Age: 80
End: 2025-07-24

## 2025-07-24 ENCOUNTER — OUTPATIENT (OUTPATIENT)
Dept: OUTPATIENT SERVICES | Facility: HOSPITAL | Age: 80
LOS: 1 days | End: 2025-07-24
Payer: MEDICARE

## 2025-07-24 VITALS
TEMPERATURE: 98 F | RESPIRATION RATE: 18 BRPM | WEIGHT: 205.91 LBS | SYSTOLIC BLOOD PRESSURE: 147 MMHG | HEIGHT: 72 IN | HEART RATE: 68 BPM | DIASTOLIC BLOOD PRESSURE: 90 MMHG | OXYGEN SATURATION: 98 %

## 2025-07-24 VITALS
SYSTOLIC BLOOD PRESSURE: 128 MMHG | OXYGEN SATURATION: 99 % | RESPIRATION RATE: 18 BRPM | TEMPERATURE: 98 F | HEART RATE: 52 BPM | DIASTOLIC BLOOD PRESSURE: 66 MMHG

## 2025-07-24 DIAGNOSIS — Z98.890 OTHER SPECIFIED POSTPROCEDURAL STATES: Chronic | ICD-10-CM

## 2025-07-24 DIAGNOSIS — D50.9 IRON DEFICIENCY ANEMIA, UNSPECIFIED: ICD-10-CM

## 2025-07-24 PROCEDURE — 96374 THER/PROPH/DIAG INJ IV PUSH: CPT

## 2025-07-24 PROCEDURE — 96372 THER/PROPH/DIAG INJ SC/IM: CPT

## 2025-07-24 RX ORDER — CYANOCOBALAMIN 1000 UG/ML
1000 INJECTION INTRAMUSCULAR; SUBCUTANEOUS ONCE
Refills: 0 | Status: COMPLETED | OUTPATIENT
Start: 2025-07-24 | End: 2025-07-24

## 2025-07-24 RX ORDER — FERUMOXYTOL 510 MG/17ML
510 INJECTION INTRAVENOUS ONCE
Refills: 0 | Status: COMPLETED | OUTPATIENT
Start: 2025-07-24 | End: 2025-07-24

## 2025-07-24 RX ADMIN — FERUMOXYTOL 468 MILLIGRAM(S): 510 INJECTION INTRAVENOUS at 16:07

## 2025-07-24 RX ADMIN — CYANOCOBALAMIN 1000 MICROGRAM(S): 1000 INJECTION INTRAMUSCULAR; SUBCUTANEOUS at 16:06

## 2025-07-24 RX ADMIN — FERUMOXYTOL 510 MILLIGRAM(S): 510 INJECTION INTRAVENOUS at 16:22

## 2025-08-21 ENCOUNTER — APPOINTMENT (OUTPATIENT)
Dept: INFUSION THERAPY | Facility: CLINIC | Age: 80
End: 2025-08-21

## 2025-08-21 ENCOUNTER — NON-APPOINTMENT (OUTPATIENT)
Age: 80
End: 2025-08-21

## 2025-08-21 ENCOUNTER — OUTPATIENT (OUTPATIENT)
Dept: OUTPATIENT SERVICES | Facility: HOSPITAL | Age: 80
LOS: 1 days | End: 2025-08-21
Payer: MEDICARE

## 2025-08-21 VITALS
SYSTOLIC BLOOD PRESSURE: 140 MMHG | OXYGEN SATURATION: 99 % | HEART RATE: 69 BPM | RESPIRATION RATE: 18 BRPM | TEMPERATURE: 98 F | DIASTOLIC BLOOD PRESSURE: 69 MMHG

## 2025-08-21 VITALS
HEIGHT: 72 IN | OXYGEN SATURATION: 97 % | DIASTOLIC BLOOD PRESSURE: 81 MMHG | TEMPERATURE: 98 F | SYSTOLIC BLOOD PRESSURE: 142 MMHG | WEIGHT: 199.96 LBS | HEART RATE: 75 BPM | RESPIRATION RATE: 18 BRPM

## 2025-08-21 DIAGNOSIS — D50.9 IRON DEFICIENCY ANEMIA, UNSPECIFIED: ICD-10-CM

## 2025-08-21 DIAGNOSIS — T50.905A ADVERSE EFFECT OF UNSPECIFIED DRUGS, MEDICAMENTS AND BIOLOGICAL SUBSTANCES, INITIAL ENCOUNTER: ICD-10-CM

## 2025-08-21 DIAGNOSIS — Z98.890 OTHER SPECIFIED POSTPROCEDURAL STATES: Chronic | ICD-10-CM

## 2025-08-21 PROCEDURE — 96372 THER/PROPH/DIAG INJ SC/IM: CPT

## 2025-08-21 PROCEDURE — 96374 THER/PROPH/DIAG INJ IV PUSH: CPT

## 2025-08-21 PROCEDURE — 96375 TX/PRO/DX INJ NEW DRUG ADDON: CPT

## 2025-08-21 RX ORDER — METHYLPREDNISOLONE 4 MG/1
4 TABLET ORAL
Qty: 1 | Refills: 0 | Status: ACTIVE | COMMUNITY
Start: 2025-08-21 | End: 1900-01-01

## 2025-08-21 RX ORDER — METHYLPREDNISOLONE ACETATE 80 MG/ML
125 INJECTION, SUSPENSION INTRA-ARTICULAR; INTRALESIONAL; INTRAMUSCULAR; SOFT TISSUE ONCE
Refills: 0 | Status: COMPLETED | OUTPATIENT
Start: 2025-08-21 | End: 2025-08-21

## 2025-08-21 RX ORDER — DIPHENHYDRAMINE HCL 12.5MG/5ML
50 ELIXIR ORAL ONCE
Refills: 0 | Status: COMPLETED | OUTPATIENT
Start: 2025-08-21 | End: 2025-08-21

## 2025-08-21 RX ORDER — CAMPHOR 0.45 %
25 GEL (GRAM) TOPICAL
Qty: 1 | Refills: 0 | Status: ACTIVE | COMMUNITY
Start: 2025-08-21 | End: 1900-01-01

## 2025-08-21 RX ORDER — FERUMOXYTOL 510 MG/17ML
510 INJECTION INTRAVENOUS ONCE
Refills: 0 | Status: COMPLETED | OUTPATIENT
Start: 2025-08-21 | End: 2025-08-21

## 2025-08-21 RX ORDER — CYANOCOBALAMIN 1000 UG/ML
1000 INJECTION INTRAMUSCULAR; SUBCUTANEOUS ONCE
Refills: 0 | Status: COMPLETED | OUTPATIENT
Start: 2025-08-21 | End: 2025-08-21

## 2025-08-21 RX ADMIN — CYANOCOBALAMIN 1000 MICROGRAM(S): 1000 INJECTION INTRAMUSCULAR; SUBCUTANEOUS at 15:36

## 2025-08-21 RX ADMIN — FERUMOXYTOL 468 MILLIGRAM(S): 510 INJECTION INTRAVENOUS at 15:36

## 2025-08-21 RX ADMIN — Medication 50 MILLIGRAM(S): at 16:53

## 2025-08-21 RX ADMIN — FERUMOXYTOL 510 MILLIGRAM(S): 510 INJECTION INTRAVENOUS at 15:52

## 2025-08-21 RX ADMIN — METHYLPREDNISOLONE ACETATE 125 MILLIGRAM(S): 80 INJECTION, SUSPENSION INTRA-ARTICULAR; INTRALESIONAL; INTRAMUSCULAR; SOFT TISSUE at 16:53

## 2025-08-22 ENCOUNTER — NON-APPOINTMENT (OUTPATIENT)
Age: 80
End: 2025-08-22

## 2025-09-18 ENCOUNTER — APPOINTMENT (OUTPATIENT)
Dept: INFUSION THERAPY | Facility: CLINIC | Age: 80
End: 2025-09-18